# Patient Record
Sex: FEMALE | Race: BLACK OR AFRICAN AMERICAN | NOT HISPANIC OR LATINO | Employment: FULL TIME | RURAL
[De-identification: names, ages, dates, MRNs, and addresses within clinical notes are randomized per-mention and may not be internally consistent; named-entity substitution may affect disease eponyms.]

---

## 2017-01-06 RX ORDER — LINAGLIPTIN 5 MG/1
TABLET, FILM COATED ORAL
Qty: 30 TABLET | Refills: 5 | Status: SHIPPED | OUTPATIENT
Start: 2017-01-06 | End: 2017-06-26 | Stop reason: SDUPTHER

## 2017-01-06 RX ORDER — LEVOTHYROXINE SODIUM 0.07 MG/1
TABLET ORAL
Qty: 30 TABLET | Refills: 5 | Status: SHIPPED | OUTPATIENT
Start: 2017-01-06 | End: 2017-06-26 | Stop reason: SDUPTHER

## 2017-02-02 RX ORDER — NORGESTIMATE AND ETHINYL ESTRADIOL 0.25-0.035
1 KIT ORAL DAILY
Qty: 3 PACKAGE | Refills: 3 | Status: SHIPPED | OUTPATIENT
Start: 2017-02-02 | End: 2017-04-06 | Stop reason: SDUPTHER

## 2017-02-09 ENCOUNTER — OFFICE VISIT (OUTPATIENT)
Dept: FAMILY MEDICINE CLINIC | Facility: CLINIC | Age: 32
End: 2017-02-09

## 2017-02-09 VITALS
DIASTOLIC BLOOD PRESSURE: 96 MMHG | SYSTOLIC BLOOD PRESSURE: 144 MMHG | OXYGEN SATURATION: 99 % | RESPIRATION RATE: 20 BRPM | HEART RATE: 116 BPM | TEMPERATURE: 97.4 F | BODY MASS INDEX: 44.41 KG/M2 | WEIGHT: 293 LBS | HEIGHT: 68 IN

## 2017-02-09 DIAGNOSIS — E53.8 VITAMIN B 12 DEFICIENCY: Primary | ICD-10-CM

## 2017-02-09 DIAGNOSIS — E11.8 TYPE 2 DIABETES MELLITUS WITH COMPLICATION, WITHOUT LONG-TERM CURRENT USE OF INSULIN (HCC): ICD-10-CM

## 2017-02-09 DIAGNOSIS — J32.9 SINUSITIS, UNSPECIFIED CHRONICITY, UNSPECIFIED LOCATION: ICD-10-CM

## 2017-02-09 DIAGNOSIS — R05.9 COUGH: ICD-10-CM

## 2017-02-09 PROCEDURE — 99213 OFFICE O/P EST LOW 20 MIN: CPT | Performed by: FAMILY MEDICINE

## 2017-02-09 PROCEDURE — 96372 THER/PROPH/DIAG INJ SC/IM: CPT | Performed by: FAMILY MEDICINE

## 2017-02-09 RX ORDER — METHYLPREDNISOLONE 4 MG/1
TABLET ORAL
Qty: 21 TABLET | Refills: 0 | Status: SHIPPED | OUTPATIENT
Start: 2017-02-09 | End: 2017-03-24

## 2017-02-09 RX ORDER — NAPROXEN 500 MG/1
500 TABLET ORAL 2 TIMES DAILY PRN
COMMUNITY
End: 2018-04-04 | Stop reason: ALTCHOICE

## 2017-02-09 RX ORDER — CYANOCOBALAMIN 1000 UG/ML
1000 INJECTION, SOLUTION INTRAMUSCULAR; SUBCUTANEOUS
Status: DISCONTINUED | OUTPATIENT
Start: 2017-02-09 | End: 2019-04-08

## 2017-02-09 RX ORDER — AZITHROMYCIN 250 MG/1
TABLET, FILM COATED ORAL
Qty: 6 TABLET | Refills: 0 | Status: SHIPPED | OUTPATIENT
Start: 2017-02-09 | End: 2017-03-24

## 2017-02-09 RX ADMIN — CYANOCOBALAMIN 1000 MCG: 1000 INJECTION, SOLUTION INTRAMUSCULAR; SUBCUTANEOUS at 14:23

## 2017-02-09 NOTE — PROGRESS NOTES
"Subjective   Reginald Hassan is a 31 y.o. female presenting with chief complaint of:   Chief Complaint   Patient presents with   • URI     [facility] ER follow up, never got better, \"they didnt give me any antibotics?\"   • Sinusitis     [facility] ER follow up, never got better \"they didnt give me any antibotics?\"       History of Present Illness :  Alone.   Has an acute issue today: URI sxs.  Onset first of year sinus/nasal congestion-fullness with nares/posterior pharyngeal drainage. Associated with fever,sore throat, cough, wheeze. No SOB, hemoptysis, chest pain, nausea, nausea/vomiting, diarrhea. Has had to miss on/off work.      Other chronic problem/s to consider:  DM2: This has been present for years/over a year.  It is chronic.  It is controlled.  Home accuchecks rarely run.  No hypoglycemia manifest as syncope/near syncope or diaphoretic/sweating episodes.  A1c below.   Hypothyroidism: This has been present for years/over a year.  It is chronic.  It is stable as there has been stable labs and energy level, hair-skin texture, stooling, are all the same; no palpitations.  Obesity: This has been present for years/over a year.  It is chronic.     It is stable; there has been no recent major change in weight, or dieting    The following portions of the patient's history were reviewed and updated as appropriate: allergies, current medications, past family history, past medical history, past social history, past surgical history and problem list.  TCC migrated if needed/reviewed.      Current Outpatient Prescriptions:   •  Guaifenesin-Codeine (CHERATUSSIN AC PO), Take 2 teaspoon(s) by mouth Every 6 (Six) Hours As Needed (cough)., Disp: , Rfl:   •  levothyroxine (SYNTHROID, LEVOTHROID) 75 MCG tablet, TAKE ONE TABLET DAILY GENERIC FOR SYNTHROID, Disp: 30 tablet, Rfl: 5  •  metFORMIN (GLUCOPHAGE) 1000 MG tablet, Take 1 tablet by mouth Every Night., Disp: 30 tablet, Rfl: 5  •  naproxen (NAPROSYN) 500 MG tablet, Take 500 mg by mouth 2 " (Two) Times a Day With Meals., Disp: , Rfl:   •  norgestimate-ethinyl estradiol (SPRINTEC 28) 0.25-35 MG-MCG per tablet, Take 1 tablet by mouth Daily., Disp: 3 package, Rfl: 3  •  TRADJENTA 5 MG tablet tablet, TAKE ONE TABLET DAILY, Disp: 30 tablet, Rfl: 5    Current Facility-Administered Medications:   •  cyanocobalamin injection 1,000 mcg, 1,000 mcg, Intramuscular, Q30 Days, Theo Auguste MD    No Known Allergies    Review of Systems  GENERAL:  Active/home and work. Sleep is ok. No fever.  ENDO:  No syncope, near or diaphoretic sweaty spells.  BS, no download noted.  HEENT: No head injury but frontal/maxillary sore-headache,  No vision change, No hearing loss.  Ears without pain/drainage.  No sore throat.  No usual nasal/sinus congestion/drainage. No epistaxis.  CHEST: No chest wall tenderness or mass. No usual cough, wheeze, SOB; no hemoptysis.  CV: No chest pain, palpatations, ankle edema.  GI: No heartburn, dysphagia.  No abdominal pain, diarrhea, constipation, rectal bleeding, or melena.  :  Voids without dysuria, or incontience to completion.  ORTHO: No painful/swollen joints but various on /off sore.  No sore neck or back.  No acute neck or back pain without recent injury.   NEURO: No dizziness, weakness of extremities.  No numbness/parethesias.   PSYCH: No memory loss.  Mood good; not anxious, depressed but/and not suicidal.  Tolerated stress.     Results for orders placed or performed in visit on 11/14/16   Liquid-based Pap Smear, Screening   Result Value Ref Range    Case Report       Gynecologic Cytology Report                       Case: [id number]                                  Authorizing Provider:  Doris Woodson MD        Collected:           11/14/2016 01:33 PM          Ordering Location:     [facility]     Received:            11/14/2016 03:13 PM                                 [facility]                                                                 First Screen:        "   Kiesha Wilson                                                               Pathologist:           Antwan Pisano MD                                                    Specimen:    Liquid-Based Pap, Screening, Cervix                                                        HPV Report, Addendum       See scanned report      Interpretation (A) Negative for intraepithelial lesion or malignancy, Other, Other-Negative for squamous intraepithelial lesion, Negative for malignant cells, See final diagnosis, None- See Attached Report, Not diagnostic, See attached report, No diagnosis given, No int...     Atypical squamous cells of undetermined significance    General Categorization Epithelial cell abnormality, see interpretation     Other Findings Trichomonas vaginalis     Specimen Adequacy Satisfactory for evaluation     HPV Reflex? If ASCUS     Additional Information       Disclaimer: Cervical cytology is a screening test primarily for squamous cancers and precursors and has associated false negative and positive results. New technologies such as liquid based sampling may decrease but will not eliminate all false negative results. Regular (generally annual) sampling and follow-up of unexplained clinical symptoms are recommended to minimize false negative results (see [facility] report, 1999).      Embedded Images         Lab Results   Component Value Date    HGBA1C 6.6 (H) 08/22/2014       Lab Results   Component Value Date     08/22/2014    K 3.7 08/22/2014     08/22/2014    CO2 23 (L) 08/22/2014    GLUCOSE 132 (H) 08/22/2014    BUN 3 (L) 08/22/2014    CREATININE 0.69 08/22/2014    CALCIUM 9.0 08/22/2014       No results found for: PSA     Objective   Visit Vitals   • /96 (BP Location: Left arm, Patient Position: Sitting, Cuff Size: Large Adult)   • Pulse 116   • Temp 97.4 °F (36.3 °C) (Tympanic)   • Resp 20   • Ht 68\" (172.7 cm)   • Wt (!) 310 lb (141 kg)   • SpO2 99%   • BMI 47.14 kg/m2 "       Physical Exam  GENERAL:  Well nourished/developed in no acute distress. Obese  SKIN: Turgor excellent, without wound, rash, lesion.  HEENT: Normal cephalic without trauma.  Pupils equal round reactive to light. Extraocular motions full without nystagmus.   External canals nonobstructive nontender without reddness. Tymphatic membranes tomeka with bryson structures intact.   Oral cavity without growths, exudates, and moist.  Posterior pharnyx without mass, obstruction, reddness.  No thyroidmegaly, mass, tenderness, lymphadenopathy and supple. Nasal mucosa red/swollen with bilateral mucoid drainage.   CV: Regular rhythm.  No murmur, gallop,  edema. Posterior pulses intact.  No carotid bruits.   CHEST: No chest wall tenderness or mass.   LUNGS: Symmetric motion with clear to auscultation.  No dullness to percussion  ABD: Soft, nontender without mass.   ORTHO: Symmetric extremities without swelling/point tenderness.  Full gross range of motion.    NEURO: CN 2-12 grossly intact.  Symmetric facies.  UE/LE   4/5 strength throughout.  Nonfocal use extremities. Speech clear.     PSYCH: Oriented x 3.  Pleasant calm, well kept.  Purposeful/directed conservation with intact short/long gross memory.     Assessment/Plan     1. Vitamin B 12 deficiency  - cyanocobalamin injection 1,000 mcg; Inject 1 mL into the shoulder, thigh, or buttocks Every 30 (Thirty) Days.    2. Type 2 diabetes mellitus with complication, without long-term current use of insulin  - metFORMIN (GLUCOPHAGE) 1000 MG tablet; Take 1 tablet by mouth Every Night.  Dispense: 30 tablet; Refill: 5    3. Sinusitis, unspecified chronicity, unspecified location  Subacute/chronic?  - azithromycin (ZITHROMAX Z-ABBY) 250 MG tablet; Take 2 tablets the first day, then 1 tablet daily for 4 days.  Dispense: 6 tablet; Refill: 0  - MethylPREDNISolone (MEDROL, ABBY,) 4 MG tablet; Take as directed on package instructions.  Dispense: 21 tablet; Refill: 0    4. Cough  Sinobronchial  and maybe acute bronchitis  - azithromycin (ZITHROMAX Z-ABBY) 250 MG tablet; Take 2 tablets the first day, then 1 tablet daily for 4 days.  Dispense: 6 tablet; Refill: 0  - MethylPREDNISolone (MEDROL, ABBY,) 4 MG tablet; Take as directed on package instructions.  Dispense: 21 tablet; Refill: 0      Rx: reviewed.  Changes if above  LAB: reviewed/above, and if orders same 6/12  Wrap-up/other instructions     Patient Instructions   Try Z pack first; if in a couple days no better get the steroid BUT need to be watching sugar while taking       Follow up: Return for 6.26.17 as planned.

## 2017-02-09 NOTE — PROGRESS NOTES
Pt here for B 12 injection as ordered. Given in RVG . Pt tolerated well and no adverse reactions noted and pt left office.

## 2017-02-09 NOTE — PATIENT INSTRUCTIONS
Try Z pack first; if in a couple days no better get the steroid BUT need to be watching sugar while taking

## 2017-03-10 ENCOUNTER — CLINICAL SUPPORT (OUTPATIENT)
Dept: FAMILY MEDICINE CLINIC | Facility: CLINIC | Age: 32
End: 2017-03-10

## 2017-03-10 DIAGNOSIS — D51.9 ANEMIA DUE TO VITAMIN B12 DEFICIENCY, UNSPECIFIED B12 DEFICIENCY TYPE: ICD-10-CM

## 2017-03-10 PROCEDURE — 96372 THER/PROPH/DIAG INJ SC/IM: CPT | Performed by: FAMILY MEDICINE

## 2017-03-10 RX ADMIN — CYANOCOBALAMIN 1000 MCG: 1000 INJECTION, SOLUTION INTRAMUSCULAR; SUBCUTANEOUS at 16:14

## 2017-03-10 NOTE — PROGRESS NOTES
Pt here for B 12 injection as ordered. Given in LVG . Pt tolerated well and no adverse reactions noted and pt left office.

## 2017-03-24 ENCOUNTER — OFFICE VISIT (OUTPATIENT)
Dept: FAMILY MEDICINE CLINIC | Facility: CLINIC | Age: 32
End: 2017-03-24

## 2017-03-24 VITALS
SYSTOLIC BLOOD PRESSURE: 152 MMHG | HEART RATE: 130 BPM | HEIGHT: 68 IN | TEMPERATURE: 98.7 F | BODY MASS INDEX: 44.41 KG/M2 | DIASTOLIC BLOOD PRESSURE: 94 MMHG | WEIGHT: 293 LBS | RESPIRATION RATE: 20 BRPM | OXYGEN SATURATION: 99 %

## 2017-03-24 DIAGNOSIS — L40.9 PSORIASIS: Primary | ICD-10-CM

## 2017-03-24 PROCEDURE — 99212 OFFICE O/P EST SF 10 MIN: CPT | Performed by: FAMILY MEDICINE

## 2017-03-24 NOTE — PROGRESS NOTES
Subjective   Reginald Hassan is a 31 y.o. female presenting with chief complaint of:   Chief Complaint   Patient presents with   • Skin Problem       History of Present Illness :  Alone.  Has an acute issue today.  Has had this rash for years. Saw derm on referral 2005; dx acanthosis nigricans and lichen simplex chronicus/pickers nodules.     Other chronic problem/s to consider: none    The following portions of the patient's history were reviewed and updated as appropriate: allergies, current medications, past family history, past medical history, past social history, past surgical history and problem list.  TCC migrated if needed/reviewed.      Current Outpatient Prescriptions:   •  levothyroxine (SYNTHROID, LEVOTHROID) 75 MCG tablet, TAKE ONE TABLET DAILY GENERIC FOR SYNTHROID, Disp: 30 tablet, Rfl: 5  •  metFORMIN (GLUCOPHAGE) 1000 MG tablet, Take 1 tablet by mouth Every Night., Disp: 30 tablet, Rfl: 5  •  naproxen (NAPROSYN) 500 MG tablet, Take 500 mg by mouth 2 (Two) Times a Day With Meals., Disp: , Rfl:   •  norgestimate-ethinyl estradiol (SPRINTEC 28) 0.25-35 MG-MCG per tablet, Take 1 tablet by mouth Daily., Disp: 3 package, Rfl: 3  •  TRADJENTA 5 MG tablet tablet, TAKE ONE TABLET DAILY, Disp: 30 tablet, Rfl: 5    Current Facility-Administered Medications:   •  cyanocobalamin injection 1,000 mcg, 1,000 mcg, Intramuscular, Q30 Days, Theo Auguste MD, 1,000 mcg at 03/10/17 1614    No Known Allergies    Review of Systems  GENERAL:  Active/slower with limits, speed size. . Sleep is ok. No fever now.  ENDO:  No syncope, near or diaphoretic sweaty spells.  HEENT: No head injury or headache,  No vision change, No hearing loss.  Ears without pain/drainage.  No sore throat.  No nasal/sinus congestion/drainage. No epistaxis.  CHEST: No chest wall tenderness or mass. No cough,  without wheeze, SOB; no hemoptysis.  CV: No chest pain, palpatations, ankle edema.  GI: No heartburn, dysphagia.  No abdominal pain,  diarrhea, constipation, rectal bleeding, or melena.    :  Voids without dysuria, or incontience to completion.  ORTHO: No painful/swollen joints but various on /off sore.  No sore neck or back.  No acute neck or back pain without recent injury.   NEURO: No dizziness, weakness of extremities.  No numbness/parethesias.   PSYCH: No memory loss.  Mood good; not anxious, depressed but/and not suicidal.  Tolerated stress.     Results for orders placed or performed in visit on 11/14/16   Liquid-based Pap Smear, Screening   Result Value Ref Range    Case Report       Gynecologic Cytology Report                       Case: [id number]                                  Authorizing Provider:  Doris Woodson MD        Collected:           11/14/2016 01:33 PM          Ordering Location:     [facility]     Received:            11/14/2016 03:13 PM                                 [facility]                                                                 First Screen:          Kiesha Wilson                                                               Pathologist:           Antwan Pisano MD                                                    Specimen:    Liquid-Based Pap, Screening, Cervix                                                        HPV Report, Addendum       See scanned report      Interpretation (A) Negative for intraepithelial lesion or malignancy, Other, Other-Negative for squamous intraepithelial lesion, Negative for malignant cells, See final diagnosis, None- See Attached Report, Not diagnostic, See attached report, No diagnosis given, No int...     Atypical squamous cells of undetermined significance    General Categorization Epithelial cell abnormality, see interpretation     Other Findings Trichomonas vaginalis     Specimen Adequacy Satisfactory for evaluation     HPV Reflex? If ASCUS     Additional Information       Disclaimer: Cervical cytology is a screening test primarily for squamous  "cancers and precursors and has associated false negative and positive results. New technologies such as liquid based sampling may decrease but will not eliminate all false negative results. Regular (generally annual) sampling and follow-up of unexplained clinical symptoms are recommended to minimize false negative results (see [facility] report, 1999).      Embedded Images         Lab Results   Component Value Date    HGBA1C 6.6 (H) 08/22/2014       Lab Results   Component Value Date     08/22/2014    K 3.7 08/22/2014     08/22/2014    CO2 23 (L) 08/22/2014    GLUCOSE 132 (H) 08/22/2014    BUN 3 (L) 08/22/2014    CREATININE 0.69 08/22/2014    CALCIUM 9.0 08/22/2014       No results found for: PSA     Objective   /94 (BP Location: Left arm, Patient Position: Sitting, Cuff Size: Adult)  Pulse (!) 130  Temp 98.7 °F (37.1 °C) (Oral)   Resp 20  Ht 68\" (172.7 cm)  Wt (!) 312 lb (142 kg)  LMP 03/15/2017  SpO2 99%  Breastfeeding? No  BMI 47.44 kg/m2    Physical Exam  GENERAL:  Well nourished/developed in no acute distress. Obese   SKIN: Turgor excellent, without wound, rash, lesion. PHOTO L abdomen; similar all over  HEENT: Normal cephalic without trauma.  Pupils equal round reactive to light. Extraocular motions full without nystagmus.   External canals nonobstructive nontender without reddness. Tymphatic membranes tomeka with bryson structures intact.   Oral cavity without growths, exudates, and moist.  Posterior pharnyx without mass, obstruction, reddness.  No thyroidmegaly, mass, tenderness, lymphadenopathy and supple.  CV: Regular rhythm.  No murmur, gallop, trace LE edema. Posterior pulses intact.  No carotid bruits.  CHEST: No chest wall tenderness or mass.   LUNGS: Symmetric motion with clear to auscultation.   ABD: Soft, nontender without mass.   ORTHO: Symmetric extremities without swelling/point tenderness.  Full gross range of motion.    NEURO: CN 2-12 grossly intact.  Symmetric facies.  " UE/LE   3/5 strength throughout.  Nonfocal use extremities. Speech clear.     PSYCH: Oriented x 3.  Pleasant calm, well kept.  Purposeful/directed conservation with intact short/long gross memory.     Assessment/Plan     1. Psoriasis or other  - fluocinonide-emollient (LIDEX-E) 0.05 % cream; Apply  topically 2 (Two) Times a Day.  Dispense: 30 g; Refill: 0      Labs:   Rx above; try and see what results we get  Last time treated SalEx lotion acanthosis nigricans and locoid lipocream to 's nodules     There are no Patient Instructions on file for this visit.    Follow up: Return for 6/12m.

## 2017-04-06 ENCOUNTER — OFFICE VISIT (OUTPATIENT)
Dept: OBSTETRICS AND GYNECOLOGY | Facility: CLINIC | Age: 32
End: 2017-04-06

## 2017-04-06 VITALS — BODY MASS INDEX: 44.41 KG/M2 | HEIGHT: 68 IN | WEIGHT: 293 LBS

## 2017-04-06 DIAGNOSIS — N76.0 BV (BACTERIAL VAGINOSIS): Primary | ICD-10-CM

## 2017-04-06 DIAGNOSIS — B96.89 BV (BACTERIAL VAGINOSIS): Primary | ICD-10-CM

## 2017-04-06 PROCEDURE — 99213 OFFICE O/P EST LOW 20 MIN: CPT | Performed by: OBSTETRICS & GYNECOLOGY

## 2017-04-06 RX ORDER — FLUCONAZOLE 150 MG/1
150 TABLET ORAL DAILY
Qty: 2 TABLET | Refills: 0 | Status: SHIPPED | OUTPATIENT
Start: 2017-04-06 | End: 2017-06-26

## 2017-04-06 RX ORDER — CLOTRIMAZOLE 1 %
CREAM WITH APPLICATOR VAGINAL 2 TIMES DAILY
Qty: 1 EACH | Refills: 0 | Status: SHIPPED | OUTPATIENT
Start: 2017-04-06 | End: 2017-06-26

## 2017-04-06 RX ORDER — NORGESTIMATE AND ETHINYL ESTRADIOL 0.25-0.035
1 KIT ORAL DAILY
Qty: 3 PACKAGE | Refills: 3 | Status: SHIPPED | OUTPATIENT
Start: 2017-04-06 | End: 2018-04-04 | Stop reason: SDUPTHER

## 2017-04-06 NOTE — PROGRESS NOTES
Subjective   Reginald Hassan is a 31 y.o. female. Who presents with a possible vaginal infection.    History of Present Illness  Patient is a 32 yo WF who is concerned that she may have BV and she is having vaginal itching.  The following portions of the patient's history were reviewed and updated as appropriate: allergies, current medications, past family history, past medical history, past social history, past surgical history and problem list.    Review of Systems   Constitutional: Negative for fatigue and unexpected weight change.   HENT: Negative.    Eyes: Negative.    Respiratory: Negative for cough, chest tightness and shortness of breath.    Cardiovascular: Negative for chest pain, palpitations and leg swelling.   Gastrointestinal: Negative for abdominal distention, abdominal pain, anal bleeding, blood in stool, constipation, diarrhea, nausea and vomiting.   Endocrine: Negative for polydipsia and polyuria.   Genitourinary: Positive for vaginal discharge. Negative for difficulty urinating, dyspareunia, dysuria, frequency, genital sores, hematuria, menstrual problem, pelvic pain, urgency, vaginal bleeding and vaginal pain.        Vaginal itching   Musculoskeletal: Negative.    Skin: Negative for color change and rash.   Allergic/Immunologic: Negative.    Neurological: Negative.  Negative for dizziness, seizures, syncope, light-headedness and headaches.   Hematological: Negative for adenopathy. Does not bruise/bleed easily.   Psychiatric/Behavioral: Negative for agitation, confusion, dysphoric mood, sleep disturbance and suicidal ideas. The patient is not nervous/anxious.        Objective   Physical Exam   Constitutional: She is oriented to person, place, and time. She appears well-developed and well-nourished.   HENT:   Head: Normocephalic.   Eyes: Pupils are equal, round, and reactive to light.   Cardiovascular: Normal rate and regular rhythm.    Pulmonary/Chest: Effort normal and breath sounds normal.    Abdominal: Soft.   Genitourinary: Vagina normal and uterus normal. Rectal exam shows anal tone normal. Pelvic exam was performed with patient supine. No labial fusion. There is no rash, tenderness, lesion or injury on the right labia. There is no rash, tenderness, lesion or injury on the left labia. Uterus is not enlarged and not tender. Cervix exhibits no motion tenderness, no discharge and no friability. Right adnexum displays no mass, no tenderness and no fullness. Left adnexum displays no mass, no tenderness and no fullness. No erythema, tenderness or bleeding in the vagina. No signs of injury around the vagina. No vaginal discharge found.   Genitourinary Comments: BUS glands appear nl, perineum intact, urethral orifice appears nl, vagina has good support.   Musculoskeletal: Normal range of motion.   Neurological: She is alert and oriented to person, place, and time.   Skin: Skin is warm and dry.   Psychiatric: She has a normal mood and affect. Her behavior is normal.   Nursing note and vitals reviewed.      Assessment:    Yeast vaginitis  Rx Diflucan 150 x 2  Lotrimin cream

## 2017-04-17 ENCOUNTER — CLINICAL SUPPORT (OUTPATIENT)
Dept: FAMILY MEDICINE CLINIC | Facility: CLINIC | Age: 32
End: 2017-04-17

## 2017-04-17 DIAGNOSIS — E53.8 B12 DEFICIENCY: ICD-10-CM

## 2017-04-17 PROCEDURE — 96372 THER/PROPH/DIAG INJ SC/IM: CPT | Performed by: FAMILY MEDICINE

## 2017-04-17 RX ADMIN — CYANOCOBALAMIN 1000 MCG: 1000 INJECTION, SOLUTION INTRAMUSCULAR; SUBCUTANEOUS at 16:39

## 2017-06-06 ENCOUNTER — CLINICAL SUPPORT (OUTPATIENT)
Dept: FAMILY MEDICINE CLINIC | Facility: CLINIC | Age: 32
End: 2017-06-06

## 2017-06-06 DIAGNOSIS — E53.8 B12 DEFICIENCY: ICD-10-CM

## 2017-06-06 PROCEDURE — 96372 THER/PROPH/DIAG INJ SC/IM: CPT | Performed by: FAMILY MEDICINE

## 2017-06-06 RX ADMIN — CYANOCOBALAMIN 1000 MCG: 1000 INJECTION, SOLUTION INTRAMUSCULAR; SUBCUTANEOUS at 15:54

## 2017-06-13 ENCOUNTER — LAB (OUTPATIENT)
Dept: FAMILY MEDICINE CLINIC | Facility: CLINIC | Age: 32
End: 2017-06-13

## 2017-06-13 DIAGNOSIS — E66.9 OBESITY (BMI 35.0-39.9 WITHOUT COMORBIDITY): Primary | ICD-10-CM

## 2017-06-13 DIAGNOSIS — L03.311 CELLULITIS OF ABDOMINAL WALL: ICD-10-CM

## 2017-06-13 DIAGNOSIS — E03.9 HYPOTHYROIDISM, UNSPECIFIED TYPE: Chronic | ICD-10-CM

## 2017-06-13 DIAGNOSIS — E53.8 VITAMIN B12 DEFICIENCY: ICD-10-CM

## 2017-06-13 DIAGNOSIS — IMO0001 UNCONTROLLED TYPE 2 DIABETES MELLITUS WITHOUT COMPLICATION, WITHOUT LONG-TERM CURRENT USE OF INSULIN: ICD-10-CM

## 2017-06-13 DIAGNOSIS — Z00.00 WELLNESS EXAMINATION: ICD-10-CM

## 2017-06-14 DIAGNOSIS — IMO0001 UNCONTROLLED TYPE 2 DIABETES MELLITUS WITHOUT COMPLICATION, WITHOUT LONG-TERM CURRENT USE OF INSULIN: Primary | ICD-10-CM

## 2017-06-14 LAB
ALBUMIN SERPL-MCNC: 4.2 G/DL (ref 3.5–5.5)
ALBUMIN/GLOB SERPL: 1.4 {RATIO} (ref 1.2–2.2)
ALP SERPL-CCNC: 54 IU/L (ref 39–117)
ALT SERPL-CCNC: 34 IU/L (ref 0–32)
AST SERPL-CCNC: 18 IU/L (ref 0–40)
BASOPHILS # BLD AUTO: 0 X10E3/UL (ref 0–0.2)
BASOPHILS NFR BLD AUTO: 1 %
BILIRUB SERPL-MCNC: 0.4 MG/DL (ref 0–1.2)
BUN SERPL-MCNC: 6 MG/DL (ref 6–20)
BUN/CREAT SERPL: 10 (ref 9–23)
CALCIUM SERPL-MCNC: 9 MG/DL (ref 8.7–10.2)
CHLORIDE SERPL-SCNC: 98 MMOL/L (ref 96–106)
CHOLEST SERPL-MCNC: 173 MG/DL (ref 100–199)
CO2 SERPL-SCNC: 22 MMOL/L (ref 18–29)
CREAT SERPL-MCNC: 0.62 MG/DL (ref 0.57–1)
EOSINOPHIL # BLD AUTO: 0.3 X10E3/UL (ref 0–0.4)
EOSINOPHIL NFR BLD AUTO: 4 %
ERYTHROCYTE [DISTWIDTH] IN BLOOD BY AUTOMATED COUNT: 14 % (ref 12.3–15.4)
FERRITIN SERPL-MCNC: 46 NG/ML (ref 15–150)
FOLATE SERPL-MCNC: 10.2 NG/ML
GLOBULIN SER CALC-MCNC: 3.1 G/DL (ref 1.5–4.5)
GLUCOSE SERPL-MCNC: 193 MG/DL (ref 65–99)
HBA1C MFR BLD: 9.1 % (ref 4.8–5.6)
HCT VFR BLD AUTO: 37 % (ref 34–46.6)
HDLC SERPL-MCNC: 49 MG/DL
HGB BLD-MCNC: 12.2 G/DL (ref 11.1–15.9)
IMM GRANULOCYTES # BLD: 0 X10E3/UL (ref 0–0.1)
IMM GRANULOCYTES NFR BLD: 0 %
LDLC SERPL CALC-MCNC: 99 MG/DL (ref 0–99)
LYMPHOCYTES # BLD AUTO: 2.7 X10E3/UL (ref 0.7–3.1)
LYMPHOCYTES NFR BLD AUTO: 41 %
MCH RBC QN AUTO: 26.8 PG (ref 26.6–33)
MCHC RBC AUTO-ENTMCNC: 33 G/DL (ref 31.5–35.7)
MCV RBC AUTO: 81 FL (ref 79–97)
MICROALBUMIN UR-MCNC: 91.7 UG/ML
MONOCYTES # BLD AUTO: 0.4 X10E3/UL (ref 0.1–0.9)
MONOCYTES NFR BLD AUTO: 7 %
NEUTROPHILS # BLD AUTO: 3.2 X10E3/UL (ref 1.4–7)
NEUTROPHILS NFR BLD AUTO: 47 %
PLATELET # BLD AUTO: 319 X10E3/UL (ref 150–379)
POTASSIUM SERPL-SCNC: 4.2 MMOL/L (ref 3.5–5.2)
PROT SERPL-MCNC: 7.3 G/DL (ref 6–8.5)
RBC # BLD AUTO: 4.56 X10E6/UL (ref 3.77–5.28)
SODIUM SERPL-SCNC: 137 MMOL/L (ref 134–144)
T4 SERPL-MCNC: 14.1 UG/DL (ref 4.5–12)
TRIGL SERPL-MCNC: 123 MG/DL (ref 0–149)
TSH SERPL DL<=0.005 MIU/L-ACNC: 4.78 UIU/ML (ref 0.45–4.5)
VIT B12 SERPL-MCNC: 607 PG/ML (ref 211–946)
VLDLC SERPL CALC-MCNC: 25 MG/DL (ref 5–40)
WBC # BLD AUTO: 6.6 X10E3/UL (ref 3.4–10.8)

## 2017-06-16 DIAGNOSIS — E66.9 OBESITY (BMI 35.0-39.9 WITHOUT COMORBIDITY): ICD-10-CM

## 2017-06-16 DIAGNOSIS — IMO0001 UNCONTROLLED TYPE 2 DIABETES MELLITUS WITHOUT COMPLICATION, WITHOUT LONG-TERM CURRENT USE OF INSULIN: Primary | ICD-10-CM

## 2017-06-26 ENCOUNTER — OFFICE VISIT (OUTPATIENT)
Dept: FAMILY MEDICINE CLINIC | Facility: CLINIC | Age: 32
End: 2017-06-26

## 2017-06-26 VITALS
TEMPERATURE: 98.4 F | HEART RATE: 114 BPM | BODY MASS INDEX: 44.41 KG/M2 | WEIGHT: 293 LBS | SYSTOLIC BLOOD PRESSURE: 144 MMHG | HEIGHT: 68 IN | DIASTOLIC BLOOD PRESSURE: 96 MMHG | OXYGEN SATURATION: 98 % | RESPIRATION RATE: 20 BRPM

## 2017-06-26 DIAGNOSIS — IMO0001 UNCONTROLLED TYPE 2 DIABETES MELLITUS WITHOUT COMPLICATION, WITHOUT LONG-TERM CURRENT USE OF INSULIN: ICD-10-CM

## 2017-06-26 DIAGNOSIS — E03.9 HYPOTHYROIDISM, UNSPECIFIED TYPE: Primary | Chronic | ICD-10-CM

## 2017-06-26 DIAGNOSIS — E66.9 OBESITY (BMI 35.0-39.9 WITHOUT COMORBIDITY): ICD-10-CM

## 2017-06-26 DIAGNOSIS — E11.8 TYPE 2 DIABETES MELLITUS WITH COMPLICATION, WITHOUT LONG-TERM CURRENT USE OF INSULIN (HCC): ICD-10-CM

## 2017-06-26 PROCEDURE — 99214 OFFICE O/P EST MOD 30 MIN: CPT | Performed by: FAMILY MEDICINE

## 2017-06-26 RX ORDER — GLYBURIDE 5 MG/1
5 TABLET ORAL 2 TIMES DAILY WITH MEALS
Qty: 60 TABLET | Refills: 5 | Status: SHIPPED | OUTPATIENT
Start: 2017-06-26 | End: 2018-06-05 | Stop reason: SDUPTHER

## 2017-06-26 RX ORDER — LEVOTHYROXINE SODIUM 0.07 MG/1
75 TABLET ORAL DAILY
Qty: 30 TABLET | Refills: 5 | Status: SHIPPED | OUTPATIENT
Start: 2017-06-26 | End: 2017-08-31 | Stop reason: CLARIF

## 2017-06-26 NOTE — PROGRESS NOTES
Subjective   Reginald Hassan is a 31 y.o. female presenting with chief complaint of:   Chief Complaint   Patient presents with   • Diabetes   • Obesity   • Hypothyroidism   • Hypertension     BP was 144/96 pt states she just finished mowing yard before coming in       History of Present Illness :  Alone.   Has multiple chronic problems; interval appointment.  One of these problems is hypothyroidism.  Hypothyroidism: This has been present for years/over a year.  It is chronic.  It is stable as there has been stable labs and energy level, hair-skin texture, stooling, are all the same; no palpitations.. Thyroid replaced     Other chronic problem/s to consider:   DM2: This has been present for years/over a year.  It is chronic.  It is uncontrolled.  Home accuchecks rarely run.  No hypoglycemia manifest as syncope/near syncope or diaphoretic/sweating episodes.  A1c below if available..   Obesity: This has been present for years/over a year.  It is chronic.     It is stable; there has been no recent major change in weight, or dieting; have discussed obesity surgery with her.     The following portions of the patient's history were reviewed and updated as appropriate: allergies, current medications, past family history, past medical history, past social history, past surgical history and problem list.  TCC migrated if needed/reviewed.      Current Outpatient Prescriptions:   •  levothyroxine (SYNTHROID, LEVOTHROID) 75 MCG tablet, Take 1 tablet by mouth Daily., Disp: 30 tablet, Rfl: 5  •  metFORMIN (GLUCOPHAGE) 1000 MG tablet, Take 1 tablet by mouth Every Night., Disp: 30 tablet, Rfl: 5  •  naproxen (NAPROSYN) 500 MG tablet, Take 500 mg by mouth 2 (Two) Times a Day As Needed., Disp: , Rfl:   •  norgestimate-ethinyl estradiol (SPRINTEC 28) 0.25-35 MG-MCG per tablet, Take 1 tablet by mouth Daily., Disp: 3 package, Rfl: 3  •  TRADJENTA 5 MG tablet tablet, TAKE ONE TABLET DAILY, Disp: 30 tablet, Rfl: 5    Current  Facility-Administered Medications:   •  cyanocobalamin injection 1,000 mcg, 1,000 mcg, Intramuscular, Q30 Days, Theo Auguste MD, 1,000 mcg at 06/06/17 1554    No Known Allergies    Review of Systems  GENERAL:  Active/slower with limits, speed, samni for age and work/home. Sleep is ok; apnea denied.  ENDO:  No syncope, near or diaphoretic sweaty spells.  No checking./no download noted.  HEENT: No head injury occ/same headache,  No vision change, No hearing loss.  Ears without pain/drainage.  No sore throat.  No significant nasal/sinus congestion/drainage. No epistaxis.  CHEST: No chest wall tenderness or mass. No cough, without wheeze, SOB; no hemoptysis.  CV: No chest pain, palpatations, usual end of day equal ankle edema.  GI: No heartburn, dysphagia.  No abdominal pain, diarrhea, constipation, rectal bleeding, or melena.    :  Voids without dysuria, or incontience to completion.  ORTHO: No painful/swollen joints but various on /off sore.  No change occ sore neck or back.  No acute neck or back pain without recent injury.   NEURO: No dizziness, weakness of extremities.  No numbness/parethesias.   PSYCH: No memory loss.  Mood good; not that anxious, depressed but/and not suicidal.  Tolerated stress.     Results for orders placed or performed in visit on 06/13/17   Comprehensive metabolic panel   Result Value Ref Range    Glucose 193 (H) 65 - 99 mg/dL    BUN 6 6 - 20 mg/dL    Creatinine 0.62 0.57 - 1.00 mg/dL    eGFR Non African Am 121 >59 mL/min/1.73    eGFR African Am 139 >59 mL/min/1.73    BUN/Creatinine Ratio 10 9 - 23    Sodium 137 134 - 144 mmol/L    Potassium 4.2 3.5 - 5.2 mmol/L    Chloride 98 96 - 106 mmol/L    Total CO2 22 18 - 29 mmol/L    Calcium 9.0 8.7 - 10.2 mg/dL    Total Protein 7.3 6.0 - 8.5 g/dL    Albumin 4.2 3.5 - 5.5 g/dL    Globulin 3.1 1.5 - 4.5 g/dL    A/G Ratio 1.4 1.2 - 2.2    Total Bilirubin 0.4 0.0 - 1.2 mg/dL    Alkaline Phosphatase 54 39 - 117 IU/L    AST (SGOT) 18 0 - 40 IU/L     ALT (SGPT) 34 (H) 0 - 32 IU/L   Lipid panel   Result Value Ref Range    Total Cholesterol 173 100 - 199 mg/dL    Triglycerides 123 0 - 149 mg/dL    HDL Cholesterol 49 >39 mg/dL    VLDL Cholesterol 25 5 - 40 mg/dL    LDL Cholesterol  99 0 - 99 mg/dL   T4   Result Value Ref Range    T4, Total 14.1 (H) 4.5 - 12.0 ug/dL   TSH   Result Value Ref Range    TSH 4.780 (H) 0.450 - 4.500 uIU/mL   Hemoglobin A1c   Result Value Ref Range    Hemoglobin A1C 9.1 (H) 4.8 - 5.6 %   Ferritin   Result Value Ref Range    Ferritin 46 15 - 150 ng/mL   Vitamin B12 and Folate   Result Value Ref Range    Vitamin B-12 607 211 - 946 pg/mL    Folate 10.2 >3.0 ng/mL   Albumin, urine, random   Result Value Ref Range    Microalbumin, Urine 91.7 Not Estab. ug/mL   CBC and Differential   Result Value Ref Range    WBC 6.6 3.4 - 10.8 x10E3/uL    RBC 4.56 3.77 - 5.28 x10E6/uL    Hemoglobin 12.2 11.1 - 15.9 g/dL    Hematocrit 37.0 34.0 - 46.6 %    MCV 81 79 - 97 fL    MCH 26.8 26.6 - 33.0 pg    MCHC 33.0 31.5 - 35.7 g/dL    RDW 14.0 12.3 - 15.4 %    Platelets 319 150 - 379 x10E3/uL    Neutrophil Rel % 47 %    Lymphocyte Rel % 41 %    Monocyte Rel % 7 %    Eosinophil Rel % 4 %    Basophil Rel % 1 %    Neutrophils Absolute 3.2 1.4 - 7.0 x10E3/uL    Lymphocytes Absolute 2.7 0.7 - 3.1 x10E3/uL    Monocytes Absolute 0.4 0.1 - 0.9 x10E3/uL    Eosinophils Absolute 0.3 0.0 - 0.4 x10E3/uL    Basophils Absolute 0.0 0.0 - 0.2 x10E3/uL    Immature Granulocyte Rel % 0 %    Immature Grans Absolute 0.0 0.0 - 0.1 x10E3/uL       Lab Results   Component Value Date    HGBA1C 9.1 (H) 06/13/2017    HGBA1C 6.6 (H) 08/22/2014       Lab Results   Component Value Date     06/13/2017     08/22/2014    K 4.2 06/13/2017    K 3.7 08/22/2014    CL 98 06/13/2017     08/22/2014    CO2 22 06/13/2017    CO2 23 (L) 08/22/2014    GLUCOSE 132 (H) 08/22/2014    BUN 6 06/13/2017    BUN 3 (L) 08/22/2014    CREATININE 0.62 06/13/2017    CREATININE 0.69 08/22/2014     "CALCIUM 9.0 06/13/2017    CALCIUM 9.0 08/22/2014       No results found for: PSA     Lab Results:  CBC:    Lab Results - Last 18 Months  Lab Units 06/13/17  1038   WBC x10E3/uL 6.6   HEMATOCRIT % 37.0     CMP:    Lab Results - Last 18 Months  Lab Units 06/13/17  1038   SODIUM mmol/L 137   CHLORIDE mmol/L 98   TOTAL CO2, ARTERIAL mmol/L 22   BUN mg/dL 6   CREATININE mg/dL 0.62   EGFR IF NONAFRICN AM mL/min/1.73 121   EGFR IF AFRICN AM mL/min/1.73 139   CALCIUM mg/dL 9.0     THYROID:    Lab Results - Last 18 Months  Lab Units 06/13/17  1038   TSH uIU/mL 4.780*     A1C:    Lab Results - Last 18 Months  Lab Units 06/13/17  1038   HEMOGLOBIN A1C % 9.1*       Objective   /96 (BP Location: Left arm, Patient Position: Sitting, Cuff Size: Adult)  Pulse 114  Temp 98.4 °F (36.9 °C) (Oral)   Resp 20  Ht 68\" (172.7 cm)  Wt (!) 312 lb (142 kg)  LMP 06/12/2017 (Exact Date)  SpO2 98%  Breastfeeding? No  BMI 47.44 kg/m2    Physical Exam  GENERAL:  Well nourished/developed in no acute distress. Obese/morbid.  SKIN: Turgor excellent, without wound, rash, lesion.  HEENT: Normal cephalic without trauma.  Pupils equal round reactive to light. Extraocular motions full without nystagmus.   External canals nonobstructive nontender without reddness. Tymphatic membranes tomeka with bryson structures intact.   Oral cavity without growths, exudates, and moist.  Posterior pharnyx without mass, obstruction, reddness.  No thyroidmegaly, mass, tenderness, lymphadenopathy and supple.  CV: Regular rhythm.  No murmur, gallop,  edema. Posterior pulses intact.  No carotid bruits.  CHEST: No chest wall tenderness or mass.   LUNGS: Symmetric motion with clear to auscultation.  No dullness to percussion  ABD: Soft, nontender without mass.   ORTHO: Symmetric extremities without swelling/point tenderness.  Full gross range of motion.  NEURO: CN 2-12 grossly intact.  Symmetric facies. 1/4 x bicep knee equal reflexes.  UE/LE   4/5 strength " throughout.  Nonfocal use extremities. Speech clear. Intact light touch with monofilament, vibratory sensation with tuning fork; equal toes/distal feet.    PSYCH: Oriented x 3.  Pleasant calm, well kept.  Purposeful/directed conservation with intact short/long gross memory.     Assessment/Plan     1. Hypothyroidism, unspecified type  replaced    2. Uncontrolled type 2 diabetes mellitus without complication, without long-term current use of insulin  - glyBURIDE (DIABETA) 5 MG tablet; Take 1 tablet by mouth 2 (Two) Times a Day With Meals.  Dispense: 60 tablet; Refill: 5  - linagliptin (TRADJENTA) 5 MG tablet tablet; Take 1 tablet by mouth Daily.  Dispense: 30 tablet; Refill: 5    3. Type 2 diabetes mellitus with complication, without long-term current use of insulin  - metFORMIN (GLUCOPHAGE) 1000 MG tablet; Take 1 tablet by mouth 2 (Two) Times a Day With Meals.  Dispense: 30 tablet; Refill: 5    4. Obesity (BMI 35.0-39.9 without comorbidity)      Rx: reviewed.  Any other changes above and:   LAB: reviewed/above.  Orders above and: 3 extra/6 and 12 as usual  Discussed obesity surgery.   Patient Instructions   Double your metformin 1000 once a day to twice a day    Start the new diabetes medication glyburide    Goal BS morning fasting around 100-120; rest of day < 160  Checking times ; one a day  ..1. Before breakfast  2. 2 hrs after breakfast  3. Before lunch  4. 2 hrs after lunch  5. Before dinner/supper  6. 2 hrs after dinner/supper  7. Bedtime  (usually one a day just randomly picking a different time each day in order to survey your sugar control through a day)    Weight loss needed      Follow up: Return in about 6 months (around 12/26/2017) for 6m ro and lab before or lab with -extra lab 2m.

## 2017-06-26 NOTE — PATIENT INSTRUCTIONS
Double your metformin 1000 once a day to twice a day    Start the new diabetes medication glyburide    Goal BS morning fasting around 100-120; rest of day < 160  Checking times ; one a day  ..1. Before breakfast  2. 2 hrs after breakfast  3. Before lunch  4. 2 hrs after lunch  5. Before dinner/supper  6. 2 hrs after dinner/supper  7. Bedtime  (usually one a day just randomly picking a different time each day in order to survey your sugar control through a day)    Weight loss needed

## 2017-07-24 DIAGNOSIS — E11.8 TYPE 2 DIABETES MELLITUS WITH COMPLICATION, WITHOUT LONG-TERM CURRENT USE OF INSULIN (HCC): ICD-10-CM

## 2017-07-25 ENCOUNTER — TELEPHONE (OUTPATIENT)
Dept: FAMILY MEDICINE CLINIC | Facility: CLINIC | Age: 32
End: 2017-07-25

## 2017-07-25 ENCOUNTER — OFFICE VISIT (OUTPATIENT)
Dept: FAMILY MEDICINE CLINIC | Facility: CLINIC | Age: 32
End: 2017-07-25

## 2017-07-25 VITALS
DIASTOLIC BLOOD PRESSURE: 78 MMHG | OXYGEN SATURATION: 99 % | WEIGHT: 293 LBS | HEART RATE: 118 BPM | RESPIRATION RATE: 20 BRPM | SYSTOLIC BLOOD PRESSURE: 128 MMHG | HEIGHT: 68 IN | BODY MASS INDEX: 44.41 KG/M2 | TEMPERATURE: 98.2 F

## 2017-07-25 DIAGNOSIS — L02.91 ABSCESS: ICD-10-CM

## 2017-07-25 DIAGNOSIS — E53.8 VITAMIN B12 DEFICIENCY: ICD-10-CM

## 2017-07-25 DIAGNOSIS — IMO0001 UNCONTROLLED TYPE 2 DIABETES MELLITUS WITHOUT COMPLICATION, WITHOUT LONG-TERM CURRENT USE OF INSULIN: Primary | ICD-10-CM

## 2017-07-25 DIAGNOSIS — L03.319 CELLULITIS OF TRUNK, UNSPECIFIED SITE OF TRUNK: ICD-10-CM

## 2017-07-25 PROCEDURE — 96372 THER/PROPH/DIAG INJ SC/IM: CPT | Performed by: FAMILY MEDICINE

## 2017-07-25 PROCEDURE — 10060 I&D ABSCESS SIMPLE/SINGLE: CPT | Performed by: FAMILY MEDICINE

## 2017-07-25 PROCEDURE — 99212 OFFICE O/P EST SF 10 MIN: CPT | Performed by: FAMILY MEDICINE

## 2017-07-25 RX ORDER — SULFAMETHOXAZOLE AND TRIMETHOPRIM 800; 160 MG/1; MG/1
1 TABLET ORAL 3 TIMES DAILY
Qty: 30 TABLET | Refills: 0 | Status: SHIPPED | OUTPATIENT
Start: 2017-07-25 | End: 2017-12-26

## 2017-07-25 RX ADMIN — CYANOCOBALAMIN 1000 MCG: 1000 INJECTION, SOLUTION INTRAMUSCULAR; SUBCUTANEOUS at 15:15

## 2017-07-25 NOTE — PATIENT INSTRUCTIONS
The patient was advised to keep the area dry as able...showering is ok if brief and dry well when done.  Remove packing 5 days. Expect phone call on culture.

## 2017-07-25 NOTE — TELEPHONE ENCOUNTER
Pt came in and filled out nurse communication form and states that she has a knot or some type of lump on her  breast states area is sore and red  It has been there for about 1 wk [phone]

## 2017-07-25 NOTE — PROGRESS NOTES
Subjective   Reginald Hassan is a 31 y.o. female presenting with chief complaint of:   Chief Complaint   Patient presents with   • Breast Problem     Right        History of Present Illness :  Alone.  Has an acute issue today: called today with c/o 4-5 days of increasing pain R breast nodule.  Has had cellulitis before.   Has multiple chronic problems.  One of these problems is DM/2 usually uncontrolled.     Other chronic problem/s to consider:   DM2: This has been present for years/over a year.  It is chronic.  It is not controlled.  Home accuchecks run high generally.  A1c below if available..   Obesity: This has been present for years/over a year.  It is chronic.     It is stable; there has been no recent major change in weight, or dieting; has been advised weight loss.     The following portions of the patient's history were reviewed and updated as appropriate: allergies, current medications, past family history, past medical history, past social history, past surgical history and problem list.      Current Outpatient Prescriptions:   •  glyBURIDE (DIABETA) 5 MG tablet, Take 1 tablet by mouth 2 (Two) Times a Day With Meals., Disp: 60 tablet, Rfl: 5  •  levothyroxine (SYNTHROID, LEVOTHROID) 75 MCG tablet, Take 1 tablet by mouth Daily., Disp: 30 tablet, Rfl: 5  •  linagliptin (TRADJENTA) 5 MG tablet tablet, Take 1 tablet by mouth Daily., Disp: 30 tablet, Rfl: 5  •  metFORMIN (GLUCOPHAGE) 1000 MG tablet, Take 1 tablet by mouth 2 (Two) Times a Day With Meals., Disp: 60 tablet, Rfl: 5  •  naproxen (NAPROSYN) 500 MG tablet, Take 500 mg by mouth 2 (Two) Times a Day As Needed., Disp: , Rfl:   •  norgestimate-ethinyl estradiol (SPRINTEC 28) 0.25-35 MG-MCG per tablet, Take 1 tablet by mouth Daily., Disp: 3 package, Rfl: 3    Current Facility-Administered Medications:   •  cyanocobalamin injection 1,000 mcg, 1,000 mcg, Intramuscular, Q30 Days, Theo Auguste MD, 1,000 mcg at 07/25/17 0565    No Known  Allergies    Review of Systems  GENERAL:  Active home/work despite this. Sleep is ok. No fever now.  ENDO:  No syncope, near or diaphoretic sweaty spells.  No download noted.      Results for orders placed or performed in visit on 06/13/17   Comprehensive metabolic panel   Result Value Ref Range    Glucose 193 (H) 65 - 99 mg/dL    BUN 6 6 - 20 mg/dL    Creatinine 0.62 0.57 - 1.00 mg/dL    eGFR Non African Am 121 >59 mL/min/1.73    eGFR African Am 139 >59 mL/min/1.73    BUN/Creatinine Ratio 10 9 - 23    Sodium 137 134 - 144 mmol/L    Potassium 4.2 3.5 - 5.2 mmol/L    Chloride 98 96 - 106 mmol/L    Total CO2 22 18 - 29 mmol/L    Calcium 9.0 8.7 - 10.2 mg/dL    Total Protein 7.3 6.0 - 8.5 g/dL    Albumin 4.2 3.5 - 5.5 g/dL    Globulin 3.1 1.5 - 4.5 g/dL    A/G Ratio 1.4 1.2 - 2.2    Total Bilirubin 0.4 0.0 - 1.2 mg/dL    Alkaline Phosphatase 54 39 - 117 IU/L    AST (SGOT) 18 0 - 40 IU/L    ALT (SGPT) 34 (H) 0 - 32 IU/L   Lipid panel   Result Value Ref Range    Total Cholesterol 173 100 - 199 mg/dL    Triglycerides 123 0 - 149 mg/dL    HDL Cholesterol 49 >39 mg/dL    VLDL Cholesterol 25 5 - 40 mg/dL    LDL Cholesterol  99 0 - 99 mg/dL   T4   Result Value Ref Range    T4, Total 14.1 (H) 4.5 - 12.0 ug/dL   TSH   Result Value Ref Range    TSH 4.780 (H) 0.450 - 4.500 uIU/mL   Hemoglobin A1c   Result Value Ref Range    Hemoglobin A1C 9.1 (H) 4.8 - 5.6 %   Ferritin   Result Value Ref Range    Ferritin 46 15 - 150 ng/mL   Vitamin B12 and Folate   Result Value Ref Range    Vitamin B-12 607 211 - 946 pg/mL    Folate 10.2 >3.0 ng/mL   Albumin, urine, random   Result Value Ref Range    Microalbumin, Urine 91.7 Not Estab. ug/mL   CBC and Differential   Result Value Ref Range    WBC 6.6 3.4 - 10.8 x10E3/uL    RBC 4.56 3.77 - 5.28 x10E6/uL    Hemoglobin 12.2 11.1 - 15.9 g/dL    Hematocrit 37.0 34.0 - 46.6 %    MCV 81 79 - 97 fL    MCH 26.8 26.6 - 33.0 pg    MCHC 33.0 31.5 - 35.7 g/dL    RDW 14.0 12.3 - 15.4 %    Platelets 319 150 -  "379 x10E3/uL    Neutrophil Rel % 47 %    Lymphocyte Rel % 41 %    Monocyte Rel % 7 %    Eosinophil Rel % 4 %    Basophil Rel % 1 %    Neutrophils Absolute 3.2 1.4 - 7.0 x10E3/uL    Lymphocytes Absolute 2.7 0.7 - 3.1 x10E3/uL    Monocytes Absolute 0.4 0.1 - 0.9 x10E3/uL    Eosinophils Absolute 0.3 0.0 - 0.4 x10E3/uL    Basophils Absolute 0.0 0.0 - 0.2 x10E3/uL    Immature Granulocyte Rel % 0 %    Immature Grans Absolute 0.0 0.0 - 0.1 x10E3/uL       Lab Results   Component Value Date    HGBA1C 9.1 (H) 06/13/2017    HGBA1C 6.6 (H) 08/22/2014       Lab Results   Component Value Date     06/13/2017     08/22/2014    K 4.2 06/13/2017    K 3.7 08/22/2014    CL 98 06/13/2017     08/22/2014    CO2 22 06/13/2017    CO2 23 (L) 08/22/2014    GLUCOSE 132 (H) 08/22/2014    BUN 6 06/13/2017    BUN 3 (L) 08/22/2014    CREATININE 0.62 06/13/2017    CREATININE 0.69 08/22/2014    CALCIUM 9.0 06/13/2017    CALCIUM 9.0 08/22/2014       No results found for: PSA     Lab Results:  CBC:    Lab Results - Last 18 Months  Lab Units 06/13/17  1038   WBC x10E3/uL 6.6   HEMATOCRIT % 37.0     CMP:    Lab Results - Last 18 Months  Lab Units 06/13/17  1038   SODIUM mmol/L 137   CHLORIDE mmol/L 98   TOTAL CO2, ARTERIAL mmol/L 22   BUN mg/dL 6   CREATININE mg/dL 0.62   EGFR IF NONAFRICN AM mL/min/1.73 121   EGFR IF AFRICN AM mL/min/1.73 139   CALCIUM mg/dL 9.0     THYROID:    Lab Results - Last 18 Months  Lab Units 06/13/17  1038   TSH uIU/mL 4.780*     A1C:    Lab Results - Last 18 Months  Lab Units 06/13/17  1038   HEMOGLOBIN A1C % 9.1*       Objective   /78 (BP Location: Right arm, Patient Position: Sitting, Cuff Size: Adult)  Pulse 118  Temp 98.2 °F (36.8 °C) (Oral)   Resp 20  Ht 68\" (172.7 cm)  Wt (!) 316 lb (143 kg)  LMP 07/01/2017  SpO2 99%  Breastfeeding? No  BMI 48.05 kg/m2    Physical Exam  GENERAL:  Well nourished/developed in no acute distress. Obese   SKIN: Turgor excellent, without wound, rash, lesion " other than 3 cm fluctuant red/sore area R breast 7 oclock (when opened tablespoon purulent material)  HEENT: Normal cephalic without trauma.  Pupils equal round reactive to light. Extraocular motions full without nystagmus. No thyroidmegaly, mass, tenderness, lymphadenopathy and supple.  CV: Regular rhythm.  No murmur, gallop,  edema.  CHEST: No other chest wall tenderness or mass.   LUNGS: Symmetric motion with clear to auscultation.  ABD: Soft, nontender without mass.   ORTHO: Symmetric extremities without swelling/point tenderness.  Full gross range of motion.  NEURO: CN 2-12 grossly intact.  Symmetric facies.  UE/LE   3/5 strength throughout.  Nonfocal use extremities. Speech clear.     PSYCH: Oriented x 3.  Pleasant calm, well kept.  Purposeful/directed conservation with intact short/long gross memory.     Assessment/Plan     1. Vitamin B12 deficiency  replaced    2. Uncontrolled type 2 diabetes mellitus without complication, without long-term current use of insulin    3. Cellulitis of trunk, unspecified site of trunk  R breast   - sulfamethoxazole-trimethoprim (BACTRIM DS) 800-160 MG per tablet; Take 1 tablet by mouth 3 (Three) Times a Day.  Dispense: 30 tablet; Refill: 0    4. Abscess  R breast  - Anaerobic & Aerobic Culture ([facility] Only); Future  - Anaerobic & Aerobic Culture ([facility] Only)      Rx: reviewed.  Any other changes above and: pending culture  LAB: reviewed/above.  Orders above and: culture pending    Patient gave verbal permission to the procedure as described below.  Was advised there are risk of pain, scaring, infection, regrowth, need for revision/additional treatment/referral  and possible additional costs of a pathology report.  The surgical area was cleansed with betadine then wiped off with an alcohol wipe.  The lesion and surrounding tissue was injected with 2% xylocaine with epinephrine no more than 2cc.  Once the area was numb we used #11 blade to open the surface .  We expressed the  material from the area; 1 tablespoon.  We packed with wound with 1/4 iodoform gauze 3 inch internal and 1 in left hanging out.  We cleansed the area with peroxide.  Nursing cleansed the surgery site/repair and applied bandage to cover the wound.    The patient appeared to tolerate the procedure well.  The patient was asked to cleanse similiarly 2-3 times a day or prn.  The patient was asked to report signs of increased infection such as reddness, increasing pain, persistant drainage, or obvious problems now or regrowth in the future.  The patient was advised to keep the area dry as able...showering is ok if brief and dry well when done. The patient was advised there  culture: if there was to be sure they are called the results and if not called to call us 7-10 days.     Patient Instructions   The patient was advised to keep the area dry as able...showering is ok if brief and dry well when done.  Remove packing 5 days. Expect phone call on culture.       Follow up: Return for as planned 12.26.17.

## 2017-08-01 LAB
BACTERIA SPEC AEROBE CULT: ABNORMAL
BACTERIA SPEC ANAEROBE CULT: ABNORMAL
BACTERIA SPEC CULT: ABNORMAL
BACTERIA SPEC CULT: ABNORMAL

## 2017-08-29 DIAGNOSIS — E03.9 HYPOTHYROIDISM, UNSPECIFIED TYPE: Chronic | ICD-10-CM

## 2017-08-29 DIAGNOSIS — IMO0001 UNCONTROLLED TYPE 2 DIABETES MELLITUS WITHOUT COMPLICATION, WITHOUT LONG-TERM CURRENT USE OF INSULIN: Primary | ICD-10-CM

## 2017-08-30 PROBLEM — Z01.89 LABORATORY TEST: Status: ACTIVE | Noted: 2017-08-30

## 2017-08-30 LAB
ALBUMIN SERPL-MCNC: 4 G/DL (ref 3.5–5.5)
ALBUMIN/GLOB SERPL: 1.3 {RATIO} (ref 1.2–2.2)
ALP SERPL-CCNC: 50 IU/L (ref 39–117)
ALT SERPL-CCNC: 15 IU/L (ref 0–32)
AST SERPL-CCNC: 14 IU/L (ref 0–40)
BILIRUB SERPL-MCNC: 0.2 MG/DL (ref 0–1.2)
BUN SERPL-MCNC: 5 MG/DL (ref 6–20)
BUN/CREAT SERPL: 9 (ref 9–23)
CALCIUM SERPL-MCNC: 9.4 MG/DL (ref 8.7–10.2)
CHLORIDE SERPL-SCNC: 99 MMOL/L (ref 96–106)
CO2 SERPL-SCNC: 22 MMOL/L (ref 18–29)
CREAT SERPL-MCNC: 0.57 MG/DL (ref 0.57–1)
GLOBULIN SER CALC-MCNC: 3 G/DL (ref 1.5–4.5)
GLUCOSE SERPL-MCNC: 92 MG/DL (ref 65–99)
HBA1C MFR BLD: 6.6 % (ref 4.8–5.6)
POTASSIUM SERPL-SCNC: 4 MMOL/L (ref 3.5–5.2)
PROT SERPL-MCNC: 7 G/DL (ref 6–8.5)
SODIUM SERPL-SCNC: 141 MMOL/L (ref 134–144)
T4 SERPL-MCNC: 13.1 UG/DL (ref 4.5–12)
TSH SERPL DL<=0.005 MIU/L-ACNC: 6.84 UIU/ML (ref 0.45–4.5)

## 2017-08-31 ENCOUNTER — TELEPHONE (OUTPATIENT)
Dept: FAMILY MEDICINE CLINIC | Facility: CLINIC | Age: 32
End: 2017-08-31

## 2017-08-31 DIAGNOSIS — E03.9 HYPOTHYROIDISM, UNSPECIFIED TYPE: Primary | ICD-10-CM

## 2017-08-31 RX ORDER — LEVOTHYROXINE SODIUM 88 UG/1
88 TABLET ORAL DAILY
Qty: 30 TABLET | Refills: 1 | Status: SHIPPED | OUTPATIENT
Start: 2017-08-31 | End: 2017-10-27 | Stop reason: SDUPTHER

## 2017-10-05 ENCOUNTER — RESULTS ENCOUNTER (OUTPATIENT)
Dept: FAMILY MEDICINE CLINIC | Facility: CLINIC | Age: 32
End: 2017-10-05

## 2017-10-05 DIAGNOSIS — E03.9 HYPOTHYROIDISM, UNSPECIFIED TYPE: ICD-10-CM

## 2017-10-30 RX ORDER — LEVOTHYROXINE SODIUM 88 UG/1
TABLET ORAL
Qty: 30 TABLET | Refills: 0 | Status: SHIPPED | OUTPATIENT
Start: 2017-10-30 | End: 2017-11-01 | Stop reason: SDUPTHER

## 2017-10-31 ENCOUNTER — FLU SHOT (OUTPATIENT)
Dept: FAMILY MEDICINE CLINIC | Facility: CLINIC | Age: 32
End: 2017-10-31

## 2017-10-31 ENCOUNTER — LAB (OUTPATIENT)
Dept: FAMILY MEDICINE CLINIC | Facility: CLINIC | Age: 32
End: 2017-10-31

## 2017-10-31 DIAGNOSIS — Z23 NEED FOR INFLUENZA VACCINE: ICD-10-CM

## 2017-10-31 DIAGNOSIS — E03.9 HYPOTHYROIDISM, UNSPECIFIED TYPE: Primary | ICD-10-CM

## 2017-10-31 PROCEDURE — 90686 IIV4 VACC NO PRSV 0.5 ML IM: CPT | Performed by: FAMILY MEDICINE

## 2017-11-01 LAB
T4 SERPL-MCNC: 13.3 UG/DL (ref 4.5–12)
TSH SERPL DL<=0.005 MIU/L-ACNC: 3.59 MIU/ML (ref 0.47–4.68)

## 2017-11-01 RX ORDER — LEVOTHYROXINE SODIUM 88 UG/1
88 TABLET ORAL DAILY
Qty: 30 TABLET | Refills: 5 | Status: SHIPPED | OUTPATIENT
Start: 2017-11-01 | End: 2018-05-24 | Stop reason: SDUPTHER

## 2017-11-29 ENCOUNTER — TELEPHONE (OUTPATIENT)
Dept: FAMILY MEDICINE CLINIC | Facility: CLINIC | Age: 32
End: 2017-11-29

## 2017-11-29 DIAGNOSIS — J32.9 SINUSITIS, UNSPECIFIED CHRONICITY, UNSPECIFIED LOCATION: ICD-10-CM

## 2017-11-29 DIAGNOSIS — R05.9 COUGH: ICD-10-CM

## 2017-11-29 RX ORDER — METHYLPREDNISOLONE 4 MG/1
TABLET ORAL
Qty: 21 TABLET | Refills: 0 | Status: SHIPPED | OUTPATIENT
Start: 2017-11-29 | End: 2017-12-07 | Stop reason: SDUPTHER

## 2017-11-29 RX ORDER — AZITHROMYCIN 250 MG/1
TABLET, FILM COATED ORAL
Qty: 6 TABLET | Refills: 0 | Status: SHIPPED | OUTPATIENT
Start: 2017-11-29 | End: 2017-12-07 | Stop reason: SDUPTHER

## 2017-11-29 NOTE — TELEPHONE ENCOUNTER
PHONE CALL-NURSE       Reginald Hassan  1985          1. Your problem is cough, chest congestion, dry throat, sinus HA    2. Onset first sxs 11/25/17    3. Fever no    4. If yes, taken or felt feverish    5. How high has it gone    6. Coming down now    7. Wants to be seen    8. Prefers per phone yes    9. Would come in if provider requests    10. Rx/Allergy list correct yes    11. Cough dry    12. SOB no    13. Wheezing    14. Nausea/Vomiting no    15. Diarrhea no    16. If female, any chance of pregnancy no    17. If no chance of pregnancy/why on period    18. Breast feeding    19. Anything else they want us to know    20. Pharmacy MD2    [phone]    Verbal per Dr Auguste repeat last and Rx done and sent and pt aware

## 2017-12-07 ENCOUNTER — TELEPHONE (OUTPATIENT)
Dept: FAMILY MEDICINE CLINIC | Facility: CLINIC | Age: 32
End: 2017-12-07

## 2017-12-07 DIAGNOSIS — R05.9 COUGH: ICD-10-CM

## 2017-12-07 DIAGNOSIS — J32.9 SINUSITIS, UNSPECIFIED CHRONICITY, UNSPECIFIED LOCATION: ICD-10-CM

## 2017-12-07 RX ORDER — METHYLPREDNISOLONE 4 MG/1
TABLET ORAL
Qty: 21 TABLET | Refills: 0 | Status: SHIPPED | OUTPATIENT
Start: 2017-12-07 | End: 2017-12-26

## 2017-12-07 RX ORDER — AZITHROMYCIN 250 MG/1
TABLET, FILM COATED ORAL
Qty: 6 TABLET | Refills: 0 | Status: SHIPPED | OUTPATIENT
Start: 2017-12-07 | End: 2017-12-26

## 2017-12-07 NOTE — TELEPHONE ENCOUNTER
Pt came in and filled out nurse communication form and states she has had a cough that is really bad, no voice, body aches and chest hurts when she coughs for about the last week and would like to have something called in [phone] pt has used zpack and medrol in past MD2

## 2017-12-07 NOTE — TELEPHONE ENCOUNTER
Confirm no SOB, uncontrolled fevers, vomiting, diarrhea; patient comfortable symptoms are mild at this point; if this is the case may have refill of last    Along with the Rx you were given today for your upper respiratory infection (URI); feel free to use these other suggestions to help with usual symptoms.   OTC analgesics as needed (tylenol and like)  OTC cough advised (robitussin DM and equal day and nyquil and equal at night)  OTC nasal spray (Afrin/like) 1-3 days advised as needed and no longer than that  OTC saline spray (ocean/nasal) to wash the sinus/nasal passages clear and keep them moist  Vaporizer as needed  Fluids emphasis encouraged; calories optional for few days  Rest till fatigue better

## 2017-12-26 ENCOUNTER — OFFICE VISIT (OUTPATIENT)
Dept: FAMILY MEDICINE CLINIC | Facility: CLINIC | Age: 32
End: 2017-12-26

## 2017-12-26 VITALS
TEMPERATURE: 98.4 F | SYSTOLIC BLOOD PRESSURE: 132 MMHG | RESPIRATION RATE: 18 BRPM | HEART RATE: 102 BPM | WEIGHT: 293 LBS | HEIGHT: 68 IN | OXYGEN SATURATION: 99 % | DIASTOLIC BLOOD PRESSURE: 80 MMHG | BODY MASS INDEX: 44.41 KG/M2

## 2017-12-26 DIAGNOSIS — E53.8 VITAMIN B12 DEFICIENCY: ICD-10-CM

## 2017-12-26 DIAGNOSIS — E03.9 HYPOTHYROIDISM, UNSPECIFIED TYPE: Primary | Chronic | ICD-10-CM

## 2017-12-26 DIAGNOSIS — E66.9 OBESITY (BMI 35.0-39.9 WITHOUT COMORBIDITY): ICD-10-CM

## 2017-12-26 DIAGNOSIS — E11.9 CONTROLLED TYPE 2 DIABETES MELLITUS WITHOUT COMPLICATION, WITHOUT LONG-TERM CURRENT USE OF INSULIN (HCC): ICD-10-CM

## 2017-12-26 DIAGNOSIS — R20.2 PARESTHESIA: ICD-10-CM

## 2017-12-26 PROCEDURE — 99213 OFFICE O/P EST LOW 20 MIN: CPT | Performed by: FAMILY MEDICINE

## 2017-12-26 PROCEDURE — 96372 THER/PROPH/DIAG INJ SC/IM: CPT | Performed by: FAMILY MEDICINE

## 2017-12-26 RX ADMIN — CYANOCOBALAMIN 1000 MCG: 1000 INJECTION, SOLUTION INTRAMUSCULAR; SUBCUTANEOUS at 15:44

## 2017-12-26 NOTE — PATIENT INSTRUCTIONS
Suggest couple times a week minimum and daily if able    1. Before breakfast  2. 2 hrs after breakfast  3. Before lunch  4. 2 hrs after lunch  5. Before dinner/supper  6. 2 hrs after dinner/supper  7. Bedtime

## 2017-12-26 NOTE — PROGRESS NOTES
Subjective   Reginald Hassan is a 32 y.o. female presenting with chief complaint of:   Chief Complaint   Patient presents with   • Hypothyroidism   • Diabetes       History of Present Illness :  With son.  Has chronic problems; hypothyroidism.  Replaced; here to review.     Other chronic problem/s to consider:   DM2: This has been present for years/over a year.  It is chronic.  It is generally controlled.  Home accuchecks infrequently, sporatic, rarely run.  No hypoglycemia manifest as syncope/near syncope or diaphoretic/sweating episodes.  A1c below if available.  Diet loose.   Hypothyroidism/abnormal thyroid lab: This has been present for years/over a year.  It is chronic.  It is stable as there has been stable labs and energy level, hair-skin texture, stooling, are all the same; no palpitations.. Compliant with Rx; labs are needed periodic to monitor stability.   Obesity/overweight: This has been present for years/over a year.  It is chronic.     It is stable; there has been no recent major change in weight, or dieting  Associated illnesses noted that are affected by this illness. Offered obesity surgery referral.   B12 def:  Chronic/years ago.  Lowish; concerning for low enough with   Has an/another acute issue today: none.    The following portions of the patient's history were reviewed and updated as appropriate: allergies, current medications, past family history, past medical history, past social history, past surgical history and problem list.  Records acquired and reviewed; TCC migrated.      Current Outpatient Prescriptions:   •  glyBURIDE (DIABETA) 5 MG tablet, Take 1 tablet by mouth 2 (Two) Times a Day With Meals., Disp: 60 tablet, Rfl: 5  •  levothyroxine (SYNTHROID, LEVOTHROID) 88 MCG tablet, Take 1 tablet by mouth Daily., Disp: 30 tablet, Rfl: 5  •  linagliptin (TRADJENTA) 5 MG tablet tablet, Take 1 tablet by mouth Daily., Disp: 30 tablet, Rfl: 5  •  metFORMIN (GLUCOPHAGE) 1000 MG tablet, Take 1 tablet  by mouth 2 (Two) Times a Day With Meals., Disp: 60 tablet, Rfl: 5  •  naproxen (NAPROSYN) 500 MG tablet, Take 500 mg by mouth 2 (Two) Times a Day As Needed., Disp: , Rfl:   •  norgestimate-ethinyl estradiol (SPRINTEC 28) 0.25-35 MG-MCG per tablet, Take 1 tablet by mouth Daily., Disp: 3 package, Rfl: 3    Current Facility-Administered Medications:   •  cyanocobalamin injection 1,000 mcg, 1,000 mcg, Intramuscular, Q30 Days, Theo Auguste MD, 1,000 mcg at 12/26/17 1544    No Known Allergies    Review of Systems  GENERAL:  Active/slower with limits, speed, stamina for age; 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 Sleep is ok; apnea denied. No fever now.  ENDO:  No syncope, near or diaphoretic sweaty spells.  HEENT: No head injury  headache,  No vision change, No hearing loss.  Ears without pain/drainage.  No sore throat.  No nasal/sinus congestion/drainage. No epistaxis.  CHEST: No chest wall tenderness or mass. No cough, without wheeze.  No SOB; no hemoptysis.  CV: No chest pain, palpitations, ankle edema.  GI: No heartburn, dysphagia.  No abdominal pain, diarrhea, constipation.  No rectal bleeding, or melena.    :  Voids without dysuria, or incontinence to completion.  ORTHO: No painful/swollen joints but various on /off sore.  No sore neck or back.  No acute neck or back pain without recent injury.   NEURO: No dizziness, weakness of extremities.  No change occ numbness/paresthesias.   PSYCH: No memory loss.  Mood good; anxious, depressed but/and not suicidal.  Tries to tolerate stress .     Results for orders placed or performed in visit on 10/31/17   TSH   Result Value Ref Range    TSH 3.590 0.470 - 4.680 mIU/mL   T4   Result Value Ref Range    T4, Total 13.3 (H) 4.5 - 12.0 ug/dL       Lab Results   Component Value Date    HGBA1C 6.6 (H) 08/29/2017    HGBA1C 9.1 (H) 06/13/2017    HGBA1C 6.6 (H) 08/22/2014       Lab Results   Component Value Date     08/29/2017     06/13/2017      "08/22/2014    K 4.0 08/29/2017    K 4.2 06/13/2017    K 3.7 08/22/2014    CL 99 08/29/2017    CL 98 06/13/2017     08/22/2014    CO2 22 08/29/2017    CO2 22 06/13/2017    CO2 23 (L) 08/22/2014    GLUCOSE 132 (H) 08/22/2014    BUN 5 (L) 08/29/2017    BUN 6 06/13/2017    BUN 3 (L) 08/22/2014    CREATININE 0.57 08/29/2017    CREATININE 0.62 06/13/2017    CREATININE 0.69 08/22/2014    CALCIUM 9.4 08/29/2017    CALCIUM 9.0 06/13/2017    CALCIUM 9.0 08/22/2014       No results found for: PSA     Lab Results:  CBC:    Lab Results - Last 18 Months  Lab Units 06/13/17  1038   WBC x10E3/uL 6.6   HEMATOCRIT % 37.0     CMP:    Lab Results - Last 18 Months  Lab Units 08/29/17  0714 06/13/17  1038   SODIUM mmol/L 141 137   CHLORIDE mmol/L 99 98   TOTAL CO2, ARTERIAL mmol/L 22 22   BUN mg/dL 5* 6   CREATININE mg/dL 0.57 0.62   EGFR IF NONAFRICN AM mL/min/1.73 124 121   EGFR IF AFRICN AM mL/min/1.73 143 139   CALCIUM mg/dL 9.4 9.0     THYROID:    Lab Results - Last 18 Months  Lab Units 10/31/17  1402 08/29/17  0714 06/13/17  1038   TSH mIU/mL 3.590 6.840* 4.780*     A1C:    Lab Results - Last 18 Months  Lab Units 08/29/17  0714 06/13/17  1038   HEMOGLOBIN A1C % 6.6* 9.1*       Objective   /80  Pulse 102  Temp 98.4 °F (36.9 °C) (Oral)   Resp 18  Ht 172.7 cm (68\")  Wt (!) 143 kg (315 lb)  LMP 12/19/2017 (Exact Date)  SpO2 99%  Breastfeeding? No  BMI 47.9 kg/m2    Physical Exam  GENERAL:  Well nourished/developed in no acute distress. Obese   SKIN: Turgor excellent, without wound, rash, lesion.  HEENT: Normal cephalic without trauma.  Pupils equal round reactive to light. Extraocular motions full without nystagmus.   External canals nonobstructive nontender without reddness. Tymphatic membranes tomeka with bryson structures intact.   Oral cavity without growths, exudates, and moist.  Posterior pharynx without mass, obstruction, redness.  No thyromegaly, mass, tenderness, lymphadenopathy and supple.  CV: Regular " rhythm.  No murmur, gallop,  edema. Posterior pulses intact.  No carotid bruits.  CHEST: No chest wall tenderness or mass.   LUNGS: Symmetric motion with clear to auscultation.  No dullness to percussion  ABD: Soft, nontender without mass.   ORTHO: Symmetric extremities without swelling/point tenderness.  Full gross range of motion.  NEURO: CN 2-12 grossly intact.  Symmetric facies. 1/4 x bicep knee equal reflexes.  UE/LE   4/5 strength throughout.  Nonfocal use extremities. Speech clear.  Reduced light touch with monofilament, vibratory sensation with tuning fork; equal toes/distal feet.    PSYCH: Oriented x 3.  Pleasant calm, well kept.  Purposeful/directed conservation with intact short/long gross memory.     Assessment/Plan     1. Hypothyroidism, unspecified type    2. Vitamin B12 deficiency    3. Controlled type 2 diabetes mellitus without complication, without long-term current use of insulin    4. Obesity-offered referral surgery    5. Paresthesia-carpal tunnel vs B12?  -probably carpal tunnel sxs.        Rx: reviewed/changes:  same    LAB: reviewed/orders:   6m TSH, T4, A1c  12m CBC, CMP, A1c,  LIPID, TSH, T4, B12, folate    Discussions:   accuchecks twice a week    Patient Instructions   Suggest couple times a week minimum and daily if able    1. Before breakfast  2. 2 hrs after breakfast  3. Before lunch  4. 2 hrs after lunch  5. Before dinner/supper  6. 2 hrs after dinner/supper  7. Bedtime          Follow up: Return for lab 2.2017;  , lab during/or just before next apt;, Dr Auguste-6m.  Future Appointments  Date Time Provider Department [place]   2/21/2018 8:20 AM [facility] [place] [facility] None   7/9/2018 8:25 AM [facility] [place] [facility] None   7/11/2018 11:45 AM Theo Auguste MD [facility] None

## 2018-02-12 DIAGNOSIS — IMO0001 UNCONTROLLED TYPE 2 DIABETES MELLITUS WITHOUT COMPLICATION, WITHOUT LONG-TERM CURRENT USE OF INSULIN: ICD-10-CM

## 2018-02-12 RX ORDER — LINAGLIPTIN 5 MG/1
TABLET, FILM COATED ORAL
Qty: 30 TABLET | Refills: 5 | Status: SHIPPED | OUTPATIENT
Start: 2018-02-12 | End: 2018-08-25 | Stop reason: SDUPTHER

## 2018-02-16 DIAGNOSIS — E03.9 HYPOTHYROIDISM, UNSPECIFIED TYPE: Primary | Chronic | ICD-10-CM

## 2018-02-16 DIAGNOSIS — IMO0001 UNCONTROLLED TYPE 2 DIABETES MELLITUS WITHOUT COMPLICATION, WITHOUT LONG-TERM CURRENT USE OF INSULIN: ICD-10-CM

## 2018-02-21 ENCOUNTER — RESULTS ENCOUNTER (OUTPATIENT)
Dept: FAMILY MEDICINE CLINIC | Facility: CLINIC | Age: 33
End: 2018-02-21

## 2018-02-21 DIAGNOSIS — E03.9 HYPOTHYROIDISM, UNSPECIFIED TYPE: Chronic | ICD-10-CM

## 2018-02-21 DIAGNOSIS — IMO0001 UNCONTROLLED TYPE 2 DIABETES MELLITUS WITHOUT COMPLICATION, WITHOUT LONG-TERM CURRENT USE OF INSULIN: ICD-10-CM

## 2018-04-04 ENCOUNTER — OFFICE VISIT (OUTPATIENT)
Dept: OBSTETRICS AND GYNECOLOGY | Facility: CLINIC | Age: 33
End: 2018-04-04

## 2018-04-04 VITALS
WEIGHT: 293 LBS | DIASTOLIC BLOOD PRESSURE: 80 MMHG | SYSTOLIC BLOOD PRESSURE: 144 MMHG | HEIGHT: 68 IN | BODY MASS INDEX: 44.41 KG/M2

## 2018-04-04 DIAGNOSIS — Z01.419 ENCOUNTER FOR GYNECOLOGICAL EXAMINATION WITHOUT ABNORMAL FINDING: Primary | ICD-10-CM

## 2018-04-04 DIAGNOSIS — Z30.41 ENCOUNTER FOR SURVEILLANCE OF CONTRACEPTIVE PILLS: ICD-10-CM

## 2018-04-04 DIAGNOSIS — Z78.9 NON-SMOKER: ICD-10-CM

## 2018-04-04 PROCEDURE — 87798 DETECT AGENT NOS DNA AMP: CPT | Performed by: NURSE PRACTITIONER

## 2018-04-04 PROCEDURE — 1036F TOBACCO NON-USER: CPT | Performed by: NURSE PRACTITIONER

## 2018-04-04 PROCEDURE — 99395 PREV VISIT EST AGE 18-39: CPT | Performed by: NURSE PRACTITIONER

## 2018-04-04 PROCEDURE — 87512 GARDNER VAG DNA QUANT: CPT | Performed by: NURSE PRACTITIONER

## 2018-04-04 PROCEDURE — 87481 CANDIDA DNA AMP PROBE: CPT | Performed by: NURSE PRACTITIONER

## 2018-04-04 PROCEDURE — G0123 SCREEN CERV/VAG THIN LAYER: HCPCS | Performed by: NURSE PRACTITIONER

## 2018-04-04 PROCEDURE — 87661 TRICHOMONAS VAGINALIS AMPLIF: CPT | Performed by: NURSE PRACTITIONER

## 2018-04-04 PROCEDURE — 3008F BODY MASS INDEX DOCD: CPT | Performed by: NURSE PRACTITIONER

## 2018-04-04 PROCEDURE — 87624 HPV HI-RISK TYP POOLED RSLT: CPT | Performed by: NURSE PRACTITIONER

## 2018-04-04 PROCEDURE — 87591 N.GONORRHOEAE DNA AMP PROB: CPT | Performed by: NURSE PRACTITIONER

## 2018-04-04 PROCEDURE — 87491 CHLMYD TRACH DNA AMP PROBE: CPT | Performed by: NURSE PRACTITIONER

## 2018-04-04 RX ORDER — NORGESTIMATE AND ETHINYL ESTRADIOL 0.25-0.035
1 KIT ORAL DAILY
Qty: 3 PACKAGE | Refills: 3 | Status: SHIPPED | OUTPATIENT
Start: 2018-04-04 | End: 2019-03-20 | Stop reason: SDUPTHER

## 2018-04-04 NOTE — PROGRESS NOTES
"Subjective   Reginald Hassan is a 32 y.o. female.     Annual exam.     Pt reports Bartholin's cyst increases in size and tenderness during periods. Pt reports no current problems.    The following portions of the patient's history were reviewed and updated as appropriate: allergies, current medications, past family history, past medical history, past social history, past surgical history and problem list.    /80 (BP Location: Left arm, Patient Position: Sitting, Cuff Size: Large Adult)   Ht 172.7 cm (68\")   Wt (!) 142 kg (312 lb)   LMP 03/15/2018 (Exact Date)   Breastfeeding? No   BMI 47.44 kg/m²     Review of Systems   Constitutional: Negative for activity change, appetite change, fatigue and fever.   HENT: Negative for congestion, sore throat and trouble swallowing.    Eyes: Negative for pain, discharge and visual disturbance.   Respiratory: Negative for apnea, shortness of breath and wheezing.    Cardiovascular: Negative for chest pain, palpitations and leg swelling.   Gastrointestinal: Negative for abdominal pain, constipation and diarrhea.   Genitourinary: Positive for vaginal discharge. Negative for dyspareunia, dysuria, frequency, hematuria, pelvic pain, urgency and vaginal pain. Menstrual problem: periods are only 3-4 days, and are during last wk of pill pack.   Musculoskeletal: Negative for back pain and gait problem.   Skin: Negative for color change and rash.   Neurological: Negative for dizziness, weakness and numbness.   Psychiatric/Behavioral: Negative for confusion and sleep disturbance.       Objective   Physical Exam   Constitutional: She is oriented to person, place, and time. She appears well-developed and well-nourished. No distress.   HENT:   Head: Normocephalic.   Right Ear: External ear normal.   Left Ear: External ear normal.   Nose: Nose normal.   Mouth/Throat: Oropharynx is clear and moist.   Eyes: Conjunctivae are normal. Right eye exhibits no discharge. Left eye exhibits no " discharge. No scleral icterus.   Neck: Normal range of motion. Neck supple. Carotid bruit is not present. No tracheal deviation present. No thyromegaly present.   Cardiovascular: Normal rate, regular rhythm, normal heart sounds and intact distal pulses.    No murmur heard.  Pulmonary/Chest: Effort normal and breath sounds normal. No respiratory distress. She has no wheezes. Right breast exhibits no inverted nipple, no mass, no nipple discharge, no skin change and no tenderness. Left breast exhibits no inverted nipple, no mass, no nipple discharge, no skin change and no tenderness. Breasts are symmetrical. There is no breast swelling.   Abdominal: Soft. She exhibits no distension and no mass. There is no tenderness. There is no guarding. No hernia. Hernia confirmed negative in the right inguinal area and confirmed negative in the left inguinal area.   Genitourinary: Rectum normal and uterus normal. Rectal exam shows no mass. No breast tenderness, discharge or bleeding. Pelvic exam was performed with patient supine. There is no rash, tenderness, lesion or injury on the right labia. There is no rash, tenderness, lesion or injury on the left labia. Uterus is not enlarged, not fixed and not tender. Cervix exhibits no motion tenderness, no discharge and no friability. Right adnexum displays no mass, no tenderness and no fullness. Left adnexum displays no mass, no tenderness and no fullness. No erythema, tenderness or bleeding in the vagina. No foreign body in the vagina. No signs of injury around the vagina. Vaginal discharge found.   Genitourinary Comments:   BSU normal  Urethral meatus  Normal  Perineum  Normal   Musculoskeletal: Normal range of motion. She exhibits no edema or tenderness.   Lymphadenopathy:        Head (right side): No submental, no submandibular, no tonsillar, no preauricular, no posterior auricular and no occipital adenopathy present.        Head (left side): No submental, no submandibular, no  tonsillar, no preauricular, no posterior auricular and no occipital adenopathy present.     She has no cervical adenopathy.        Right cervical: No superficial cervical, no deep cervical and no posterior cervical adenopathy present.       Left cervical: No superficial cervical, no deep cervical and no posterior cervical adenopathy present.     She has no axillary adenopathy.        Right: No inguinal adenopathy present.        Left: No inguinal adenopathy present.   Neurological: She is alert and oriented to person, place, and time. Coordination normal.   Skin: Skin is warm and dry. No bruising and no rash noted. She is not diaphoretic. No erythema.   Psychiatric: She has a normal mood and affect. Her behavior is normal. Judgment and thought content normal.   Nursing note and vitals reviewed.      Assessment/Plan    Well woman exam. Pap collected, BV panel, gonorrhea and chlamydia testing added.   Pt reports annual labs followed by PCP.     Discussed pt OCP and her glucose regulation. Encouraged considering a different BC to see if it helped with glucose regulation. Pt declined at this time and opts to further discuss with PCP.     Discussed pt hx of Bartholin's cyst. No swelling or tenderness present with exam. Pt informed to schedule appt when the area is bothersome. Also collected specimen to evaluate for vaginal infection.   RV annual exam/prn.    Reginald was seen today for gynecologic exam.    Diagnoses and all orders for this visit:    Encounter for gynecological examination without abnormal finding  -     Liquid-based Pap Smear, Screening    Encounter for surveillance of contraceptive pills    BMI 45.0-49.9, adult    Non-smoker    Other orders  -     norgestimate-ethinyl estradiol (SPRINTEC 28) 0.25-35 MG-MCG per tablet; Take 1 tablet by mouth Daily.

## 2018-04-04 NOTE — PATIENT INSTRUCTIONS

## 2018-04-11 LAB
GEN CATEG CVX/VAG CYTO-IMP: NORMAL
LAB AP CASE REPORT: NORMAL
LAB AP GYN ADDITIONAL INFORMATION: NORMAL
LAB AP GYN OTHER FINDINGS: NORMAL
Lab: NORMAL
PATH INTERP SPEC-IMP: NORMAL
STAT OF ADQ CVX/VAG CYTO-IMP: NORMAL

## 2018-04-11 RX ORDER — METRONIDAZOLE 500 MG/1
500 TABLET ORAL 2 TIMES DAILY
Qty: 14 TABLET | Refills: 0 | Status: SHIPPED | OUTPATIENT
Start: 2018-04-11 | End: 2018-04-18

## 2018-05-22 DIAGNOSIS — IMO0001 UNCONTROLLED TYPE 2 DIABETES MELLITUS WITHOUT COMPLICATION, WITHOUT LONG-TERM CURRENT USE OF INSULIN: ICD-10-CM

## 2018-05-22 DIAGNOSIS — E03.9 HYPOTHYROIDISM, UNSPECIFIED TYPE: Chronic | ICD-10-CM

## 2018-05-22 DIAGNOSIS — E53.8 VITAMIN B12 DEFICIENCY: ICD-10-CM

## 2018-05-22 DIAGNOSIS — L40.9 PSORIASIS: ICD-10-CM

## 2018-05-22 DIAGNOSIS — Z01.89 LABORATORY TEST: Primary | ICD-10-CM

## 2018-05-22 DIAGNOSIS — Z00.00 WELLNESS EXAMINATION: ICD-10-CM

## 2018-05-23 ENCOUNTER — RESULTS ENCOUNTER (OUTPATIENT)
Dept: FAMILY MEDICINE CLINIC | Facility: CLINIC | Age: 33
End: 2018-05-23

## 2018-05-23 ENCOUNTER — CLINICAL SUPPORT (OUTPATIENT)
Dept: FAMILY MEDICINE CLINIC | Facility: CLINIC | Age: 33
End: 2018-05-23

## 2018-05-23 DIAGNOSIS — IMO0001 UNCONTROLLED TYPE 2 DIABETES MELLITUS WITHOUT COMPLICATION, WITHOUT LONG-TERM CURRENT USE OF INSULIN: ICD-10-CM

## 2018-05-23 DIAGNOSIS — E03.9 HYPOTHYROIDISM, UNSPECIFIED TYPE: Chronic | ICD-10-CM

## 2018-05-23 DIAGNOSIS — Z00.00 WELLNESS EXAMINATION: ICD-10-CM

## 2018-05-23 DIAGNOSIS — E53.8 VITAMIN B12 DEFICIENCY: ICD-10-CM

## 2018-05-23 DIAGNOSIS — Z01.89 LABORATORY TEST: ICD-10-CM

## 2018-05-23 DIAGNOSIS — L40.9 PSORIASIS: ICD-10-CM

## 2018-05-23 PROCEDURE — 96372 THER/PROPH/DIAG INJ SC/IM: CPT | Performed by: FAMILY MEDICINE

## 2018-05-23 RX ADMIN — CYANOCOBALAMIN 1000 MCG: 1000 INJECTION, SOLUTION INTRAMUSCULAR; SUBCUTANEOUS at 08:40

## 2018-05-24 ENCOUNTER — TELEPHONE (OUTPATIENT)
Dept: FAMILY MEDICINE CLINIC | Facility: CLINIC | Age: 33
End: 2018-05-24

## 2018-05-24 LAB
ALBUMIN SERPL-MCNC: 3.7 G/DL (ref 3.5–5)
ALBUMIN/GLOB SERPL: 1.1 G/DL (ref 1.1–2.5)
ALP SERPL-CCNC: 68 U/L (ref 24–120)
ALT SERPL-CCNC: 20 U/L (ref 0–54)
AST SERPL-CCNC: 13 U/L (ref 7–45)
BASOPHILS # BLD AUTO: 0.02 10*3/MM3 (ref 0–0.2)
BASOPHILS NFR BLD AUTO: 0.3 % (ref 0–2)
BILIRUB SERPL-MCNC: 0.3 MG/DL (ref 0.1–1)
BUN SERPL-MCNC: 6 MG/DL (ref 5–21)
BUN/CREAT SERPL: 10.7 (ref 7–25)
CALCIUM SERPL-MCNC: 9.2 MG/DL (ref 8.4–10.4)
CHLORIDE SERPL-SCNC: 102 MMOL/L (ref 98–110)
CHOLEST SERPL-MCNC: 176 MG/DL (ref 130–200)
CO2 SERPL-SCNC: 26 MMOL/L (ref 24–31)
CREAT SERPL-MCNC: 0.56 MG/DL (ref 0.5–1.4)
EOSINOPHIL # BLD AUTO: 0.27 10*3/MM3 (ref 0–0.7)
EOSINOPHIL NFR BLD AUTO: 3.7 % (ref 0–4)
ERYTHROCYTE [DISTWIDTH] IN BLOOD BY AUTOMATED COUNT: 15 % (ref 12–15)
FOLATE SERPL-MCNC: 9.77 NG/ML
GFR SERPLBLD CREATININE-BSD FMLA CKD-EPI: 125 ML/MIN/1.73
GFR SERPLBLD CREATININE-BSD FMLA CKD-EPI: >150 ML/MIN/1.73
GLOBULIN SER CALC-MCNC: 3.3 GM/DL
GLUCOSE SERPL-MCNC: 97 MG/DL (ref 70–100)
HBA1C MFR BLD: 7.5 %
HCT VFR BLD AUTO: 36.6 % (ref 37–47)
HDLC SERPL-MCNC: 49 MG/DL
HGB BLD-MCNC: 11.5 G/DL (ref 12–16)
IMM GRANULOCYTES # BLD: 0.03 10*3/MM3 (ref 0–0.03)
IMM GRANULOCYTES NFR BLD: 0.4 % (ref 0–5)
LDLC SERPL CALC-MCNC: 102 MG/DL (ref 0–99)
LYMPHOCYTES # BLD AUTO: 2.64 10*3/MM3 (ref 0.72–4.86)
LYMPHOCYTES NFR BLD AUTO: 36 % (ref 15–45)
MCH RBC QN AUTO: 25.2 PG (ref 28–32)
MCHC RBC AUTO-ENTMCNC: 31.4 G/DL (ref 33–36)
MCV RBC AUTO: 80.1 FL (ref 82–98)
MICROALBUMIN UR-MCNC: 93.9 UG/ML
MONOCYTES # BLD AUTO: 0.39 10*3/MM3 (ref 0.19–1.3)
MONOCYTES NFR BLD AUTO: 5.3 % (ref 4–12)
NEUTROPHILS # BLD AUTO: 3.99 10*3/MM3 (ref 1.87–8.4)
NEUTROPHILS NFR BLD AUTO: 54.3 % (ref 39–78)
NRBC BLD AUTO-RTO: 0 /100 WBC (ref 0–0)
PLATELET # BLD AUTO: 362 10*3/MM3 (ref 130–400)
POTASSIUM SERPL-SCNC: 4.4 MMOL/L (ref 3.5–5.3)
PROT SERPL-MCNC: 7 G/DL (ref 6.3–8.7)
RBC # BLD AUTO: 4.57 10*6/MM3 (ref 4.2–5.4)
SODIUM SERPL-SCNC: 140 MMOL/L (ref 135–145)
T4 SERPL-MCNC: 11.8 UG/DL (ref 4.5–12)
TRIGL SERPL-MCNC: 124 MG/DL (ref 0–149)
TSH SERPL DL<=0.005 MIU/L-ACNC: 7.79 MIU/ML (ref 0.47–4.68)
VIT B12 SERPL-MCNC: >1000 PG/ML (ref 239–931)
VLDLC SERPL CALC-MCNC: 24.8 MG/DL
WBC # BLD AUTO: 7.34 10*3/MM3 (ref 4.8–10.8)

## 2018-05-24 RX ORDER — LEVOTHYROXINE SODIUM 88 UG/1
88 TABLET ORAL DAILY
Qty: 30 TABLET | Refills: 5 | Status: SHIPPED | OUTPATIENT
Start: 2018-05-24 | End: 2019-01-10 | Stop reason: SDUPTHER

## 2018-06-04 NOTE — PROGRESS NOTES
Pt informed ok, but be aware bs moving upward. No change at least until through next appt Transposition Flap Text: The defect edges were debeveled with a #15 scalpel blade.  Given the location of the defect and the proximity to free margins a transposition flap was deemed most appropriate.  Using a sterile surgical marker, an appropriate transposition flap was drawn incorporating the defect.    The area thus outlined was incised deep to adipose tissue with a #15 scalpel blade.  The skin margins were undermined to an appropriate distance in all directions utilizing iris scissors.

## 2018-06-05 DIAGNOSIS — IMO0001 UNCONTROLLED TYPE 2 DIABETES MELLITUS WITHOUT COMPLICATION, WITHOUT LONG-TERM CURRENT USE OF INSULIN: ICD-10-CM

## 2018-06-05 RX ORDER — GLYBURIDE 5 MG/1
TABLET ORAL
Qty: 60 TABLET | Refills: 5 | Status: SHIPPED | OUTPATIENT
Start: 2018-06-05 | End: 2019-04-08

## 2018-07-11 ENCOUNTER — OFFICE VISIT (OUTPATIENT)
Dept: FAMILY MEDICINE CLINIC | Facility: CLINIC | Age: 33
End: 2018-07-11

## 2018-07-11 VITALS
OXYGEN SATURATION: 98 % | HEART RATE: 112 BPM | TEMPERATURE: 98.7 F | WEIGHT: 293 LBS | HEIGHT: 68 IN | SYSTOLIC BLOOD PRESSURE: 120 MMHG | DIASTOLIC BLOOD PRESSURE: 90 MMHG | BODY MASS INDEX: 44.41 KG/M2 | RESPIRATION RATE: 18 BRPM

## 2018-07-11 DIAGNOSIS — E53.8 VITAMIN B12 DEFICIENCY: Primary | ICD-10-CM

## 2018-07-11 DIAGNOSIS — E11.9 CONTROLLED TYPE 2 DIABETES MELLITUS WITHOUT COMPLICATION, WITHOUT LONG-TERM CURRENT USE OF INSULIN (HCC): ICD-10-CM

## 2018-07-11 DIAGNOSIS — E66.9 OBESITY (BMI 35.0-39.9 WITHOUT COMORBIDITY): ICD-10-CM

## 2018-07-11 DIAGNOSIS — E03.9 HYPOTHYROIDISM, UNSPECIFIED TYPE: Chronic | ICD-10-CM

## 2018-07-11 PROCEDURE — 96372 THER/PROPH/DIAG INJ SC/IM: CPT | Performed by: FAMILY MEDICINE

## 2018-07-11 PROCEDURE — 99213 OFFICE O/P EST LOW 20 MIN: CPT | Performed by: FAMILY MEDICINE

## 2018-07-11 RX ADMIN — CYANOCOBALAMIN 1000 MCG: 1000 INJECTION, SOLUTION INTRAMUSCULAR; SUBCUTANEOUS at 12:33

## 2018-07-11 NOTE — PATIENT INSTRUCTIONS
Your A1c today is 7.5    A1c values:   <5.5 what we see on a normal/non-diabetic person  6.5 what it takes to be diagnosed with diabetes AND what most endocrinologist recommend if you are a diabetic  7.5 what the American diabetic association says you should be.

## 2018-07-11 NOTE — PROGRESS NOTES
Subjective   Reginald Hassan is a 32 y.o. female presenting with chief complaint of:   Chief Complaint   Patient presents with   • Diabetes     Patient is here today for a 6 month follow-up and vit b12 injection.   • Hypothyroidism       History of Present Illness :  With son.   Has multiple chronic problems to consider that might have a bearing on today's issues;  an interval appointment.       Chronic/acute problems to review today:   1. Vitamin B12 deficiency-lowish with paresthesia: chronic/stable as discovered and now treated.     2. Controlled type 2 diabetes mellitus without complication, without long-term current use of insulin (CMS/[facility]): chronic/stable without significant sweating, suggestion for hypoglycemia.     3. Hypothyroidism, unspecified type: chronic/stale with long term replacement.    4. Obesity-offered referral surgery: chronic/worsens slowly generally with pattern weight gain or no loss.      Has an/another acute issue today: none.    The following portions of the patient's history were reviewed and updated as appropriate: allergies, current medications, past family history, past medical history, past social history, past surgical history and problem list.  Records acquired and reviewed; TCC migrated.      Current Outpatient Prescriptions:   •  glyBURIDE (DIAbeta) 5 MG tablet, TAKE ONE TABLET TWICE DAILY WITH FOOD, Disp: 60 tablet, Rfl: 5  •  levothyroxine (SYNTHROID, LEVOTHROID) 88 MCG tablet, Take 1 tablet by mouth Daily., Disp: 30 tablet, Rfl: 5  •  metFORMIN (GLUCOPHAGE) 1000 MG tablet, Take 1 tablet by mouth 2 (Two) Times a Day With Meals., Disp: 60 tablet, Rfl: 5  •  norgestimate-ethinyl estradiol (SPRINTEC 28) 0.25-35 MG-MCG per tablet, Take 1 tablet by mouth Daily., Disp: 3 package, Rfl: 3  •  TRADJENTA 5 MG tablet tablet, TAKE ONE TABLET DAILY, Disp: 30 tablet, Rfl: 5    Current Facility-Administered Medications:   •  cyanocobalamin injection 1,000 mcg, 1,000 mcg, Intramuscular, Q30 Days,  Theo Auguste MD, 1,000 mcg at 07/11/18 1233    No problems with medications.  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 Sleep is ok; apnea denied. No fever now.  ENDO:  No syncope, near or diaphoretic sweaty spells.  HEENT: No head injury  headache,  No vision change, No hearing loss.  Ears without pain/drainage.  No sore throat.  No nasal/sinus congestion/drainage. No epistaxis.  CHEST: No chest wall tenderness or mass. No cough, without wheeze.  No SOB; no hemoptysis.  CV: No chest pain, palpitations, ankle edema.  GI: No heartburn, dysphagia.  No abdominal pain, diarrhea, constipation.  No rectal bleeding, or melena.    :  Voids without dysuria, or incontinence to completion.  ORTHO: No painful/swollen joints but various on /off sore.  No sore neck or back.  No acute neck or back pain without recent injury.   NEURO: No dizziness, weakness of extremities.  No change occ numbness/paresthesias.   PSYCH: No memory loss.  Mood good; anxious, depressed but/and not suicidal.  Tries to tolerate stress .     Screening:  Mammogram: long ago-maybe 2019  Bone density: NA  Low dose CT chest: NA  GI: NA    Results for orders placed or performed in visit on 05/23/18   Hemoglobin A1c   Result Value Ref Range    Hemoglobin A1C 7.50 %   MicroAlbumin, Urine, Random - Urine, Clean Catch   Result Value Ref Range    Microalbumin, Urine 93.9 Not Estab. ug/mL   Vitamin B12   Result Value Ref Range    Vitamin B-12 >1000 (H) 239 - 931 pg/mL   Folate   Result Value Ref Range    Folate 9.77 >2.75 ng/mL   Comprehensive metabolic panel   Result Value Ref Range    Glucose 97 70 - 100 mg/dL    BUN 6 5 - 21 mg/dL    Creatinine 0.56 0.50 - 1.40 mg/dL    eGFR Non African Am 125 >60 mL/min/1.73    eGFR African Am >150 >60 mL/min/1.73    BUN/Creatinine Ratio 10.7 7.0 - 25.0    Sodium 140 135 - 145 mmol/L     Potassium 4.4 3.5 - 5.3 mmol/L    Chloride 102 98 - 110 mmol/L    Total CO2 26.0 24.0 - 31.0 mmol/L    Calcium 9.2 8.4 - 10.4 mg/dL    Total Protein 7.0 6.3 - 8.7 g/dL    Albumin 3.70 3.50 - 5.00 g/dL    Globulin 3.3 gm/dL    A/G Ratio 1.1 1.1 - 2.5 g/dL    Total Bilirubin 0.3 0.1 - 1.0 mg/dL    Alkaline Phosphatase 68 24 - 120 U/L    AST (SGOT) 13 7 - 45 U/L    ALT (SGPT) 20 0 - 54 U/L   Lipid panel   Result Value Ref Range    Total Cholesterol 176 130 - 200 mg/dL    Triglycerides 124 0 - 149 mg/dL    HDL Cholesterol 49 (L) >=50 mg/dL    VLDL Cholesterol 24.8 mg/dL    LDL Cholesterol  102 (H) 0 - 99 mg/dL   T4   Result Value Ref Range    T4, Total 11.8 4.5 - 12.0 ug/dL   TSH   Result Value Ref Range    TSH 7.790 (H) 0.470 - 4.680 mIU/mL   CBC and Differential   Result Value Ref Range    WBC 7.34 4.80 - 10.80 10*3/mm3    RBC 4.57 4.20 - 5.40 10*6/mm3    Hemoglobin 11.5 (L) 12.0 - 16.0 g/dL    Hematocrit 36.6 (L) 37.0 - 47.0 %    MCV 80.1 (L) 82.0 - 98.0 fL    MCH 25.2 (L) 28.0 - 32.0 pg    MCHC 31.4 (L) 33.0 - 36.0 g/dL    RDW 15.0 12.0 - 15.0 %    Platelets 362 130 - 400 10*3/mm3    Neutrophil Rel % 54.3 39.0 - 78.0 %    Lymphocyte Rel % 36.0 15.0 - 45.0 %    Monocyte Rel % 5.3 4.0 - 12.0 %    Eosinophil Rel % 3.7 0.0 - 4.0 %    Basophil Rel % 0.3 0.0 - 2.0 %    Neutrophils Absolute 3.99 1.87 - 8.40 10*3/mm3    Lymphocytes Absolute 2.64 0.72 - 4.86 10*3/mm3    Monocytes Absolute 0.39 0.19 - 1.30 10*3/mm3    Eosinophils Absolute 0.27 0.00 - 0.70 10*3/mm3    Basophils Absolute 0.02 0.00 - 0.20 10*3/mm3    Immature Granulocyte Rel % 0.4 0.0 - 5.0 %    Immature Grans Absolute 0.03 0.00 - 0.03 10*3/mm3    nRBC 0.0 0.0 - 0.0 /100 WBC       No results found for: PSA     Lab Results:  CBC:    Lab Results - Last 18 Months  Lab Units 05/23/18  0741 06/13/17  1038   WBC x10E3/uL  --  6.6   HEMOGLOBIN g/dL 11.5* 12.2   HEMATOCRIT % 36.6* 37.0   PLATELETS 10*3/mm3 362 319      BMP/CMP:    Lab Results - Last 18 Months  Lab Units  "05/23/18  0741 08/29/17  0714 06/13/17  1038   SODIUM mmol/L 140 141 137   POTASSIUM mmol/L 4.4 4.0 4.2   CHLORIDE mmol/L 102 99 98   TOTAL CO2, ARTERIAL mmol/L 26.0 22 22   GLUCOSE mg/dL 97 92 193*   BUN mg/dL 6 5* 6   CREATININE mg/dL 0.56 0.57 0.62   EGFR IF NONAFRICN AM mL/min/1.73 125 124 121   EGFR IF AFRICN AM mL/min/1.73 >150 143 139   CALCIUM mg/dL 9.2 9.4 9.0     HEPATIC:    Lab Results - Last 18 Months  Lab Units 05/23/18  0741 08/29/17  0714 06/13/17  1038   ALT (SGPT) U/L 20 15 34*   AST (SGOT) U/L 13 14 18   ALK PHOS U/L 68 50 54     THYROID:    Lab Results - Last 18 Months  Lab Units 05/23/18  0741 10/31/17  1402 08/29/17  0714 06/13/17  1038   TSH mIU/mL 7.790* 3.590 6.840* 4.780*     A1C:    Lab Results - Last 18 Months  Lab Units 05/23/18  0741 08/29/17  0714 06/13/17  1038   HEMOGLOBIN A1C % 7.50 6.6* 9.1*     PSA:No results for input(s): PSA in the last 06230 hours.    Objective   /90 (BP Location: Left arm, Patient Position: Sitting)   Pulse 112   Temp 98.7 °F (37.1 °C) (Oral)   Resp 18   Ht 172.7 cm (68\")   Wt (!) 140 kg (308 lb)   SpO2 98%   BMI 46.83 kg/m²   Body mass index is 46.83 kg/m².    Physical Exam  GENERAL:  Well nourished/developed in no acute distress. Obese   SKIN: Turgor excellent, without wound, rash, lesion.  HEENT: Normal cephalic without trauma.  Pupils equal round reactive to light. Extraocular motions full without nystagmus.   External canals nonobstructive nontender without reddness. Tymphatic membranes tomeka with bryson structures intact.   Oral cavity without growths, exudates, and moist.  Posterior pharynx without mass, obstruction, redness.  No thyromegaly, mass, tenderness, lymphadenopathy and supple.  CV: Regular rhythm.  No murmur, gallop,  edema. Posterior pulses intact.  No carotid bruits.  CHEST: No chest wall tenderness or mass.   LUNGS: Symmetric motion with clear to auscultation.  No dullness to percussion  ABD: Soft, nontender without mass. "   ORTHO: Symmetric extremities without swelling/point tenderness.  Full gross range of motion.  NEURO: CN 2-12 grossly intact.  Symmetric facies. 1/4 x bicep knee equal reflexes.  UE/LE   4/5 strength throughout.  Nonfocal use extremities. Speech clear.  Reduced light touch with monofilament, vibratory sensation with tuning fork; equal toes/distal feet.    PSYCH: Oriented x 3.  Pleasant calm, well kept.  Purposeful/directed conservation with intact short/long gross memory.     Assessment/Plan     1. Vitamin B12 deficiency-lowish with paresthesia    2. Controlled type 2 diabetes mellitus without complication, without long-term current use of insulin (CMS/[facility])    3. Hypothyroidism, unspecified type    4. Obesity-offered referral surgery        Rx: reviewed/changes:  same    LAB/Testing/Referrals: reviewed/orders:   Today:   No orders of the defined types were placed in this encounter.    Usual:   6m TSH, T4, A1c  12m CBC, CMP, A1c,  LIPID, TSH, T4, B12, folate    Discussions:     Body mass index is 46.83 kg/m².   Patient's Body mass index is 46.83 kg/m². BMI is above normal parameters. Recommendations include: exercise counseling, nutrition counseling and as before.  Non-smoker    Patient Instructions   Your A1c today is 7.5    A1c values:   <5.5 what we see on a normal/non-diabetic person  6.5 what it takes to be diagnosed with diabetes AND what most endocrinologist recommend if you are a diabetic  7.5 what the American diabetic association says you should be.          Follow up: Return for lab during/or just before next apt;, Dr Auguste-, 6 m;.  Future Appointments  Date Time Provider Department Center   12/28/2018 1:15 PM Theo Auguste MD [facility] None

## 2018-07-24 ENCOUNTER — TELEPHONE (OUTPATIENT)
Dept: FAMILY MEDICINE CLINIC | Facility: CLINIC | Age: 33
End: 2018-07-24

## 2018-07-24 DIAGNOSIS — IMO0001 UNCONTROLLED TYPE 2 DIABETES MELLITUS WITHOUT COMPLICATION, WITHOUT LONG-TERM CURRENT USE OF INSULIN: Primary | ICD-10-CM

## 2018-07-24 NOTE — TELEPHONE ENCOUNTER
Pt came in ad filled out nurse communication form and states that her BS today has bryant running really high at 8089 it was 435 @ 948 it was 391 then  About a hour ago she ate pizza rolls and her BS is now 417 and would evelia to know what to do [phone]

## 2018-07-24 NOTE — TELEPHONE ENCOUNTER
Current Outpatient Prescriptions:   •  glyBURIDE (DIAbeta) 5 MG tablet, TAKE ONE TABLET TWICE DAILY WITH FOOD, Disp: 60 tablet, Rfl: 5  •  levothyroxine (SYNTHROID, LEVOTHROID) 88 MCG tablet, Take 1 tablet by mouth Daily., Disp: 30 tablet, Rfl: 5  •  metFORMIN (GLUCOPHAGE) 1000 MG tablet, Take 1 tablet by mouth 2 (Two) Times a Day With Meals., Disp: 60 tablet, Rfl: 5  •  norgestimate-ethinyl estradiol (SPRINTEC 28) 0.25-35 MG-MCG per tablet, Take 1 tablet by mouth Daily., Disp: 3 package, Rfl: 3  •  TRADJENTA 5 MG tablet tablet, TAKE ONE TABLET DAILY, Disp: 30 tablet, Rfl: 5    Current Facility-Administered Medications:   •  cyanocobalamin injection 1,000 mcg, 1,000 mcg, Intramuscular, Q30 Days, Theo Auguste MD, 1,000 mcg at 07/11/18 1233     Diet needs a lot of work; pizza rolls not a good choice when BS up.   May need referral to dietary to review DM diet  Come here and get Rx for   Give her sample of humalog/novolog and have her give herself 10 units every 4 hours until her BS comes below 180 then stop;  Stay hydrated; 60 oz water/24 hr  Apt tomorrow afternoon

## 2018-07-24 NOTE — TELEPHONE ENCOUNTER
Advised pt to come in and get sample of Novolog flex pen with needle tip to take 10U until below 180 and stop per Dr Auguste, copy of directions for pt and will do insulin teaching

## 2018-07-25 ENCOUNTER — OFFICE VISIT (OUTPATIENT)
Dept: FAMILY MEDICINE CLINIC | Facility: CLINIC | Age: 33
End: 2018-07-25

## 2018-07-25 VITALS
TEMPERATURE: 98.9 F | OXYGEN SATURATION: 100 % | SYSTOLIC BLOOD PRESSURE: 124 MMHG | WEIGHT: 293 LBS | DIASTOLIC BLOOD PRESSURE: 70 MMHG | HEART RATE: 108 BPM | BODY MASS INDEX: 44.41 KG/M2 | HEIGHT: 68 IN | RESPIRATION RATE: 18 BRPM

## 2018-07-25 DIAGNOSIS — E11.65 TYPE 2 DIABETES MELLITUS WITH HYPERGLYCEMIA, WITHOUT LONG-TERM CURRENT USE OF INSULIN (HCC): ICD-10-CM

## 2018-07-25 DIAGNOSIS — R73.9 HYPERGLYCEMIA: ICD-10-CM

## 2018-07-25 DIAGNOSIS — R53.83 FATIGUE, UNSPECIFIED TYPE: ICD-10-CM

## 2018-07-25 DIAGNOSIS — E03.9 HYPOTHYROIDISM, UNSPECIFIED TYPE: Chronic | ICD-10-CM

## 2018-07-25 PROCEDURE — 99214 OFFICE O/P EST MOD 30 MIN: CPT | Performed by: FAMILY MEDICINE

## 2018-07-25 NOTE — PATIENT INSTRUCTIONS
Change insulin 10 units with meal and for bedtime    Sliding scale  Based on BS give this much insulin EXTRA to any other insulin/diabetic medication being used.     150-199 2 units  200-249 3 units  250-299 4 units  300-349 5 units  350-400 6 units  Greater 400 7 units

## 2018-07-25 NOTE — PROGRESS NOTES
Subjective   Reginald Hassan is a 32 y.o. female presenting with chief complaint of:   Chief Complaint   Patient presents with   • Diabetes       History of Present Illness :  Alone.  Here for primarily an acute issue today; call yesterday with BS 400s. Stopped as advised and quickly learned how to use humalog.  Has home report; BS dropped 400s to 160 range quickly; used 40 units/24 hr.   Has multiple chronic problems to consider that might have a bearing on today's issues; not an interval appointment.   No fever, infection, steroids.     Chronic/acute problems to review today:   1. Type 2 diabetes mellitus with hyperglycemia, without long-term current use of insulin (CMS/[facility]): chronic/variable and A1c ok last.    2. Hypothyroidism, unspecified type: chronic/variable with increase Rx 2m ago; needs repeat lab    3. Hyperglycemia: acute/with above   4. Fatigue, unspecified type: acute/with above.      Has an/another acute issue today: none.    The following portions of the patient's history were reviewed and updated as appropriate: allergies, current medications, past family history, past medical history, past social history, past surgical history and problem list.  Records acquired and reviewed; TCC migrated.      Current Outpatient Prescriptions:   •  glyBURIDE (DIAbeta) 5 MG tablet, TAKE ONE TABLET TWICE DAILY WITH FOOD, Disp: 60 tablet, Rfl: 5  •  insulin aspart (NOVOLOG FLEXPEN) 100 UNIT/ML solution pen-injector sc pen, 10 Units Q 4 hours until BS comes below 180 then stop, Disp: 1 pen, Rfl: 0  •  levothyroxine (SYNTHROID, LEVOTHROID) 88 MCG tablet, Take 1 tablet by mouth Daily., Disp: 30 tablet, Rfl: 5  •  metFORMIN (GLUCOPHAGE) 1000 MG tablet, Take 1 tablet by mouth 2 (Two) Times a Day With Meals., Disp: 60 tablet, Rfl: 5  •  norgestimate-ethinyl estradiol (SPRINTEC 28) 0.25-35 MG-MCG per tablet, Take 1 tablet by mouth Daily., Disp: 3 package, Rfl: 3  •  TRADJENTA 5 MG tablet tablet, TAKE ONE TABLET DAILY, Disp:  30 tablet, Rfl: 5    Current Facility-Administered Medications:   •  cyanocobalamin injection 1,000 mcg, 1,000 mcg, Intramuscular, Q30 Days, Theo Auguste MD, 1,000 mcg at 07/11/18 1233    No problems with medications.  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 Sleep is ok; apnea denied. No fever now.  ENDO:  No syncope, near or diaphoretic sweaty spells; just fatigue.   HEENT: No head injury  headache,  No vision change, No hearing loss.  Ears without pain/drainage.  No sore throat.  No nasal/sinus congestion/drainage. No epistaxis.  CHEST: No chest wall tenderness or mass. No cough, without wheeze.  No SOB; no hemoptysis.  CV: No chest pain, palpitations, ankle edema.  GI: No heartburn, dysphagia.  No abdominal pain, diarrhea, constipation.  No rectal bleeding, or melena.    :  Voids without dysuria, or incontinence to completion.  ORTHO: No painful/swollen joints but various on /off sore.  No sore neck or back.  No acute neck or back pain without recent injury.   NEURO: No dizziness, weakness of extremities.  No change occ numbness/paresthesias.   PSYCH: No memory loss.  Mood good; anxious, depressed but/and not suicidal.  Tries to tolerate stress .     Screening:  Mammogram: 2009  Bone density: NA  Low dose CT chest: NA  GI:   NA  Prostate: NA  Usual lab order  6m TSH, T4, A1c  12m CBC, CMP, A1c,  LIPID, TSH, T4, B12, folate    Results for orders placed or performed in visit on 05/23/18   Hemoglobin A1c   Result Value Ref Range    Hemoglobin A1C 7.50 %   MicroAlbumin, Urine, Random - Urine, Clean Catch   Result Value Ref Range    Microalbumin, Urine 93.9 Not Estab. ug/mL   Vitamin B12   Result Value Ref Range    Vitamin B-12 >1000 (H) 239 - 931 pg/mL   Folate   Result Value Ref Range    Folate 9.77 >2.75 ng/mL   Comprehensive metabolic panel   Result Value Ref Range     Glucose 97 70 - 100 mg/dL    BUN 6 5 - 21 mg/dL    Creatinine 0.56 0.50 - 1.40 mg/dL    eGFR Non African Am 125 >60 mL/min/1.73    eGFR African Am >150 >60 mL/min/1.73    BUN/Creatinine Ratio 10.7 7.0 - 25.0    Sodium 140 135 - 145 mmol/L    Potassium 4.4 3.5 - 5.3 mmol/L    Chloride 102 98 - 110 mmol/L    Total CO2 26.0 24.0 - 31.0 mmol/L    Calcium 9.2 8.4 - 10.4 mg/dL    Total Protein 7.0 6.3 - 8.7 g/dL    Albumin 3.70 3.50 - 5.00 g/dL    Globulin 3.3 gm/dL    A/G Ratio 1.1 1.1 - 2.5 g/dL    Total Bilirubin 0.3 0.1 - 1.0 mg/dL    Alkaline Phosphatase 68 24 - 120 U/L    AST (SGOT) 13 7 - 45 U/L    ALT (SGPT) 20 0 - 54 U/L   Lipid panel   Result Value Ref Range    Total Cholesterol 176 130 - 200 mg/dL    Triglycerides 124 0 - 149 mg/dL    HDL Cholesterol 49 (L) >=50 mg/dL    VLDL Cholesterol 24.8 mg/dL    LDL Cholesterol  102 (H) 0 - 99 mg/dL   T4   Result Value Ref Range    T4, Total 11.8 4.5 - 12.0 ug/dL   TSH   Result Value Ref Range    TSH 7.790 (H) 0.470 - 4.680 mIU/mL   CBC and Differential   Result Value Ref Range    WBC 7.34 4.80 - 10.80 10*3/mm3    RBC 4.57 4.20 - 5.40 10*6/mm3    Hemoglobin 11.5 (L) 12.0 - 16.0 g/dL    Hematocrit 36.6 (L) 37.0 - 47.0 %    MCV 80.1 (L) 82.0 - 98.0 fL    MCH 25.2 (L) 28.0 - 32.0 pg    MCHC 31.4 (L) 33.0 - 36.0 g/dL    RDW 15.0 12.0 - 15.0 %    Platelets 362 130 - 400 10*3/mm3    Neutrophil Rel % 54.3 39.0 - 78.0 %    Lymphocyte Rel % 36.0 15.0 - 45.0 %    Monocyte Rel % 5.3 4.0 - 12.0 %    Eosinophil Rel % 3.7 0.0 - 4.0 %    Basophil Rel % 0.3 0.0 - 2.0 %    Neutrophils Absolute 3.99 1.87 - 8.40 10*3/mm3    Lymphocytes Absolute 2.64 0.72 - 4.86 10*3/mm3    Monocytes Absolute 0.39 0.19 - 1.30 10*3/mm3    Eosinophils Absolute 0.27 0.00 - 0.70 10*3/mm3    Basophils Absolute 0.02 0.00 - 0.20 10*3/mm3    Immature Granulocyte Rel % 0.4 0.0 - 5.0 %    Immature Grans Absolute 0.03 0.00 - 0.03 10*3/mm3    nRBC 0.0 0.0 - 0.0 /100 WBC       No results found for: PSA     Lab  "Results:  CBC:    Lab Results - Last 18 Months  Lab Units 05/23/18  0741 06/13/17  1038   WBC x10E3/uL  --  6.6   HEMOGLOBIN g/dL 11.5* 12.2   HEMATOCRIT % 36.6* 37.0   PLATELETS 10*3/mm3 362 319      BMP/CMP:    Lab Results - Last 18 Months  Lab Units 05/23/18  0741 08/29/17  0714 06/13/17  1038   SODIUM mmol/L 140 141 137   POTASSIUM mmol/L 4.4 4.0 4.2   CHLORIDE mmol/L 102 99 98   TOTAL CO2, ARTERIAL mmol/L 26.0 22 22   GLUCOSE mg/dL 97 92 193*   BUN mg/dL 6 5* 6   CREATININE mg/dL 0.56 0.57 0.62   EGFR IF NONAFRICN AM mL/min/1.73 125 124 121   EGFR IF AFRICN AM mL/min/1.73 >150 143 139   CALCIUM mg/dL 9.2 9.4 9.0     HEPATIC:    Lab Results - Last 18 Months  Lab Units 05/23/18  0741 08/29/17  0714 06/13/17  1038   ALT (SGPT) U/L 20 15 34*   AST (SGOT) U/L 13 14 18   ALK PHOS U/L 68 50 54     THYROID:    Lab Results - Last 18 Months  Lab Units 05/23/18  0741 10/31/17  1402 08/29/17  0714 06/13/17  1038   TSH mIU/mL 7.790* 3.590 6.840* 4.780*     A1C:    Lab Results - Last 18 Months  Lab Units 05/23/18  0741 08/29/17  0714 06/13/17  1038   HEMOGLOBIN A1C % 7.50 6.6* 9.1*     PSA:No results for input(s): PSA in the last 88508 hours.    Objective   /70   Pulse 108   Temp 98.9 °F (37.2 °C) (Oral)   Resp 18   Ht 172.7 cm (68\")   Wt (!) 138 kg (304 lb)   LMP 06/29/2018 (Exact Date)   SpO2 100%   Breastfeeding? No   BMI 46.22 kg/m²   Body mass index is 46.22 kg/m².    Physical Exam  GENERAL:  Well nourished/developed in no acute distress. Obese   SKIN: Turgor excellent, without wound, rash, lesion.  HEENT: Normal cephalic without trauma.  Pupils equal round reactive to light. Extraocular motions full without nystagmus.   External canals nonobstructive nontender without reddness. Tymphatic membranes tomeka with bryson structures intact.   Oral cavity without growths, exudates, and moist.  Posterior pharynx without mass, obstruction, redness.  No thyromegaly, mass, tenderness, lymphadenopathy and supple.  CV: " Regular rhythm.  No murmur, gallop,  edema. Posterior pulses intact.  No carotid bruits.  CHEST: No chest wall tenderness or mass.   LUNGS: Symmetric motion with clear to auscultation.  No dullness to percussion  ABD: Soft, nontender without mass.   ORTHO: Symmetric extremities without swelling/point tenderness.  Full gross range of motion.  NEURO: CN 2-12 grossly intact.  Symmetric facies. 1/4 x bicep knee equal reflexes.  UE/LE   4/5 strength throughout.  Nonfocal use extremities. Speech clear.  Reduced light touch with monofilament, vibratory sensation with tuning fork; equal toes/distal feet.    PSYCH: Oriented x 3.  Pleasant calm, well kept.  Purposeful/directed conservation with intact short/long gross memory.     Assessment/Plan     1. Type 2 diabetes mellitus with hyperglycemia, without long-term current use of insulin (CMS/[facility])    2. Hypothyroidism, unspecified type    3. Hyperglycemia    4. Fatigue, unspecified type      Rx: reviewed/changes:  10 units meal and SS/bedtime    LAB/Testing/Referrals: reviewed/orders:   Today:   Orders Placed This Encounter   Procedures   • T4   • Comprehensive Metabolic Panel   • TSH   • T4, free   • Hemoglobin A1c   • Urinalysis With Culture If Indicated - Urine, Clean Catch   • CBC & Differential       Discussions:   May need insulin chronically  Body mass index is 46.22 kg/m².   Patient's Body mass index is 46.22 kg/m². BMI is above normal parameters. Recommendations include: exercise counseling and nutrition counseling.  Non-smoker      Patient Instructions   Change insulin 10 units with meal and for bedtime    Sliding scale  Based on BS give this much insulin EXTRA to any other insulin/diabetic medication being used.     150-199 2 units  200-249 3 units  250-299 4 units  300-349 5 units  350-400 6 units  Greater 400 7 units        Follow up: Return for lab today;, lab;, Dr Auguste-, as planned;.  Future Appointments  Date Time Provider Department Center   12/28/2018 1:15  PM Theo Auguste MD [facility] None

## 2018-07-27 DIAGNOSIS — IMO0001 UNCONTROLLED TYPE 2 DIABETES MELLITUS WITHOUT COMPLICATION, WITHOUT LONG-TERM CURRENT USE OF INSULIN: ICD-10-CM

## 2018-07-27 LAB
ALBUMIN SERPL-MCNC: 4.1 G/DL (ref 3.5–5)
ALBUMIN/GLOB SERPL: 1.2 G/DL (ref 1.1–2.5)
ALP SERPL-CCNC: 59 U/L (ref 24–120)
ALT SERPL-CCNC: 19 U/L (ref 0–54)
APPEARANCE UR: ABNORMAL
AST SERPL-CCNC: 15 U/L (ref 7–45)
BACTERIA #/AREA URNS HPF: ABNORMAL /HPF
BACTERIA UR CULT: NORMAL
BACTERIA UR CULT: NORMAL
BASOPHILS # BLD AUTO: 0.06 10*3/MM3 (ref 0–0.2)
BASOPHILS NFR BLD AUTO: 0.6 % (ref 0–2)
BILIRUB SERPL-MCNC: 0.6 MG/DL (ref 0.1–1)
BILIRUB UR QL STRIP: NEGATIVE
BUN SERPL-MCNC: 10 MG/DL (ref 5–21)
BUN/CREAT SERPL: 16.9 (ref 7–25)
CALCIUM SERPL-MCNC: 9.7 MG/DL (ref 8.4–10.4)
CASTS URNS MICRO: ABNORMAL
CASTS URNS QL MICRO: PRESENT /LPF
CHLORIDE SERPL-SCNC: 96 MMOL/L (ref 98–110)
CO2 SERPL-SCNC: 27 MMOL/L (ref 24–31)
COLOR UR: ABNORMAL
CREAT SERPL-MCNC: 0.59 MG/DL (ref 0.5–1.4)
EOSINOPHIL # BLD AUTO: 0.16 10*3/MM3 (ref 0–0.7)
EOSINOPHIL NFR BLD AUTO: 1.6 % (ref 0–4)
EPI CELLS #/AREA URNS HPF: >10 /HPF
ERYTHROCYTE [DISTWIDTH] IN BLOOD BY AUTOMATED COUNT: 14.3 % (ref 12–15)
GLOBULIN SER CALC-MCNC: 3.3 GM/DL
GLUCOSE SERPL-MCNC: 192 MG/DL (ref 70–100)
GLUCOSE UR QL: ABNORMAL
HBA1C MFR BLD: 9.5 %
HCT VFR BLD AUTO: 38.4 % (ref 37–47)
HGB BLD-MCNC: 12.1 G/DL (ref 12–16)
HGB UR QL STRIP: ABNORMAL
IMM GRANULOCYTES # BLD: 0.03 10*3/MM3 (ref 0–0.03)
IMM GRANULOCYTES NFR BLD: 0.3 % (ref 0–5)
KETONES UR QL STRIP: NEGATIVE
LEUKOCYTE ESTERASE UR QL STRIP: ABNORMAL
LYMPHOCYTES # BLD AUTO: 3.15 10*3/MM3 (ref 0.72–4.86)
LYMPHOCYTES NFR BLD AUTO: 31 % (ref 15–45)
MCH RBC QN AUTO: 25 PG (ref 28–32)
MCHC RBC AUTO-ENTMCNC: 31.5 G/DL (ref 33–36)
MCV RBC AUTO: 79.3 FL (ref 82–98)
MICRO URNS: ABNORMAL
MONOCYTES # BLD AUTO: 0.48 10*3/MM3 (ref 0.19–1.3)
MONOCYTES NFR BLD AUTO: 4.7 % (ref 4–12)
MUCOUS THREADS URNS QL MICRO: PRESENT /HPF
NEUTROPHILS # BLD AUTO: 6.28 10*3/MM3 (ref 1.87–8.4)
NEUTROPHILS NFR BLD AUTO: 61.8 % (ref 39–78)
NITRITE UR QL STRIP: NEGATIVE
NRBC BLD AUTO-RTO: 0 /100 WBC (ref 0–0)
PH UR STRIP: 5 [PH] (ref 5–7.5)
PLATELET # BLD AUTO: 414 10*3/MM3 (ref 130–400)
POTASSIUM SERPL-SCNC: 4.2 MMOL/L (ref 3.5–5.3)
PROT SERPL-MCNC: 7.4 G/DL (ref 6.3–8.7)
PROT UR QL STRIP: ABNORMAL
RBC # BLD AUTO: 4.84 10*6/MM3 (ref 4.2–5.4)
RBC #/AREA URNS HPF: ABNORMAL /HPF
SODIUM SERPL-SCNC: 137 MMOL/L (ref 135–145)
SP GR UR: 1.03 (ref 1–1.03)
T4 FREE SERPL-MCNC: 1.41 NG/DL (ref 0.78–2.19)
TSH SERPL DL<=0.005 MIU/L-ACNC: 7.05 MIU/ML (ref 0.47–4.68)
URINALYSIS REFLEX: ABNORMAL
UROBILINOGEN UR STRIP-MCNC: 0.2 MG/DL (ref 0.2–1)
WBC # BLD AUTO: 10.16 10*3/MM3 (ref 4.8–10.8)
WBC #/AREA URNS HPF: ABNORMAL /HPF

## 2018-08-25 DIAGNOSIS — IMO0001 UNCONTROLLED TYPE 2 DIABETES MELLITUS WITHOUT COMPLICATION, WITHOUT LONG-TERM CURRENT USE OF INSULIN: ICD-10-CM

## 2018-08-27 RX ORDER — LINAGLIPTIN 5 MG/1
TABLET, FILM COATED ORAL
Qty: 30 TABLET | Refills: 2 | Status: SHIPPED | OUTPATIENT
Start: 2018-08-27 | End: 2018-12-03 | Stop reason: SDUPTHER

## 2018-09-19 ENCOUNTER — OFFICE VISIT (OUTPATIENT)
Dept: OBSTETRICS AND GYNECOLOGY | Facility: CLINIC | Age: 33
End: 2018-09-19

## 2018-09-19 VITALS
DIASTOLIC BLOOD PRESSURE: 96 MMHG | WEIGHT: 293 LBS | HEIGHT: 68 IN | SYSTOLIC BLOOD PRESSURE: 152 MMHG | BODY MASS INDEX: 44.41 KG/M2

## 2018-09-19 DIAGNOSIS — N76.1 CHRONIC VAGINITIS: Primary | ICD-10-CM

## 2018-09-19 DIAGNOSIS — Z78.9 NON-SMOKER: ICD-10-CM

## 2018-09-19 PROCEDURE — 99213 OFFICE O/P EST LOW 20 MIN: CPT | Performed by: NURSE PRACTITIONER

## 2018-09-19 PROCEDURE — 87512 GARDNER VAG DNA QUANT: CPT | Performed by: NURSE PRACTITIONER

## 2018-09-19 PROCEDURE — 87481 CANDIDA DNA AMP PROBE: CPT | Performed by: NURSE PRACTITIONER

## 2018-09-19 PROCEDURE — 87491 CHLMYD TRACH DNA AMP PROBE: CPT | Performed by: NURSE PRACTITIONER

## 2018-09-19 PROCEDURE — 87798 DETECT AGENT NOS DNA AMP: CPT | Performed by: NURSE PRACTITIONER

## 2018-09-19 PROCEDURE — 87591 N.GONORRHOEAE DNA AMP PROB: CPT | Performed by: NURSE PRACTITIONER

## 2018-09-19 PROCEDURE — 87661 TRICHOMONAS VAGINALIS AMPLIF: CPT | Performed by: NURSE PRACTITIONER

## 2018-09-19 RX ORDER — FLUCONAZOLE 150 MG/1
150 TABLET ORAL EVERY OTHER DAY
Qty: 2 TABLET | Refills: 0 | Status: SHIPPED | OUTPATIENT
Start: 2018-09-19 | End: 2018-09-22

## 2018-09-19 RX ORDER — CLOTRIMAZOLE AND BETAMETHASONE DIPROPIONATE 10; .64 MG/G; MG/G
CREAM TOPICAL 2 TIMES DAILY
Qty: 15 G | Refills: 0 | Status: SHIPPED | OUTPATIENT
Start: 2018-09-19 | End: 2019-04-08

## 2018-09-19 NOTE — PROGRESS NOTES
"Gricelda Hassan is a 32 y.o. female.     Vaginitis   This is a new problem. The current episode started more than 1 month ago. The problem occurs constantly. The problem has been gradually worsening. Pertinent negatives include no abdominal pain, chest pain, fatigue, fever, headaches, nausea or vomiting. The symptoms are aggravated by intercourse. Treatments tried: OTC treatment. The treatment provided no relief.       The following portions of the patient's history were reviewed and updated as appropriate: allergies, current medications, past family history, past medical history, past social history, past surgical history and problem list.    /96 (BP Location: Left arm, Patient Position: Sitting)   Ht 172.7 cm (68\")   Wt (!) 140 kg (308 lb)   LMP 09/05/2018 (Approximate)   BMI 46.83 kg/m²     Review of Systems   Constitutional: Negative for activity change, appetite change, fatigue and fever.   Respiratory: Negative for apnea and shortness of breath.    Cardiovascular: Negative for chest pain and palpitations.   Gastrointestinal: Negative for abdominal distention, abdominal pain, constipation, diarrhea, nausea and vomiting.   Endocrine: Negative for cold intolerance and heat intolerance.   Genitourinary: Positive for vaginal discharge. Negative for difficulty urinating, frequency, menstrual problem, pelvic pain and vaginal pain.        Vaginal irritation, labial abrasion     Neurological: Negative for headaches.   Psychiatric/Behavioral: Negative for agitation and sleep disturbance.       Objective   Physical Exam   Constitutional: She is oriented to person, place, and time. She appears well-developed.   HENT:   Head: Normocephalic.   Neck: No tracheal deviation present.   Cardiovascular: Normal rate.    Pulmonary/Chest: Breath sounds normal. She has no wheezes.   Abdominal: Soft. There is no tenderness.   Genitourinary: Rectum normal. There is tenderness on the right labia. There is no rash on " the right labia. There is tenderness on the left labia. There is no rash on the left labia. Uterus is not deviated, not enlarged and not tender. Cervix exhibits no motion tenderness and no discharge. Right adnexum displays no mass, no tenderness and no fullness. Left adnexum displays no mass, no tenderness and no fullness. No erythema or tenderness in the vagina. No vaginal discharge found.   Genitourinary Comments: Labial irritation noted on left and broken skin   Lymphadenopathy:        Right: No inguinal adenopathy present.        Left: No inguinal adenopathy present.   Neurological: She is alert and oriented to person, place, and time. She has normal strength.   Skin: Skin is warm and dry. No rash noted. No cyanosis.   Psychiatric: She has a normal mood and affect. Her speech is normal and behavior is normal.       Assessment/Plan    Specimen collected for BV panel, gonorrhea and chlamydia testing.  Diflucan and Lotrisone sent to pharmacy. Further treatment if needed when results are available.     Patient's Body mass index is 46.83 kg/m². BMI is above normal parameters. Recommendations include: educational material.  RV based on results.   Reginald was seen today for vaginitis.    Diagnoses and all orders for this visit:    Chronic vaginitis  -     Gynecologic Fluid, Supplemental Testing    BMI 45.0-49.9, adult (CMS/[facility])    Non-smoker    Other orders  -     fluconazole (DIFLUCAN) 150 MG tablet; Take 1 tablet by mouth Every Other Day for 2 doses.  -     clotrimazole-betamethasone (LOTRISONE) 1-0.05 % cream; Apply  topically to the appropriate area as directed 2 (Two) Times a Day.

## 2018-09-24 NOTE — PATIENT INSTRUCTIONS

## 2018-09-26 LAB
GEN CATEG CVX/VAG CYTO-IMP: NORMAL
LAB AP CASE REPORT: NORMAL
Lab: NORMAL
PATH INTERP SPEC-IMP: NORMAL
STAT OF ADQ CVX/VAG CYTO-IMP: NORMAL

## 2018-12-03 DIAGNOSIS — IMO0001 UNCONTROLLED TYPE 2 DIABETES MELLITUS WITHOUT COMPLICATION, WITHOUT LONG-TERM CURRENT USE OF INSULIN: ICD-10-CM

## 2018-12-03 RX ORDER — LINAGLIPTIN 5 MG/1
TABLET, FILM COATED ORAL
Qty: 30 TABLET | Refills: 5 | Status: SHIPPED | OUTPATIENT
Start: 2018-12-03 | End: 2019-04-08

## 2018-12-07 ENCOUNTER — TELEPHONE (OUTPATIENT)
Dept: FAMILY MEDICINE CLINIC | Facility: CLINIC | Age: 33
End: 2018-12-07

## 2018-12-07 NOTE — TELEPHONE ENCOUNTER
rec notice of tier change for Tradjenta (non formulary)  and it will not be covered and suggest pt to try Januvia for a lower cost option?

## 2018-12-10 DIAGNOSIS — E11.8 TYPE 2 DIABETES MELLITUS WITH COMPLICATION, WITHOUT LONG-TERM CURRENT USE OF INSULIN (HCC): ICD-10-CM

## 2019-01-10 RX ORDER — LEVOTHYROXINE SODIUM 88 UG/1
TABLET ORAL
Qty: 30 TABLET | Refills: 0 | Status: SHIPPED | OUTPATIENT
Start: 2019-01-10

## 2019-04-08 ENCOUNTER — OFFICE VISIT (OUTPATIENT)
Dept: OBSTETRICS AND GYNECOLOGY | Facility: CLINIC | Age: 34
End: 2019-04-08

## 2019-04-08 VITALS
HEIGHT: 68 IN | BODY MASS INDEX: 44.41 KG/M2 | DIASTOLIC BLOOD PRESSURE: 86 MMHG | WEIGHT: 293 LBS | SYSTOLIC BLOOD PRESSURE: 142 MMHG

## 2019-04-08 DIAGNOSIS — Z01.419 WELL WOMAN EXAM WITH ROUTINE GYNECOLOGICAL EXAM: Primary | ICD-10-CM

## 2019-04-08 DIAGNOSIS — Z12.4 CERVICAL CANCER SCREENING: ICD-10-CM

## 2019-04-08 DIAGNOSIS — B37.31 YEAST VAGINITIS: ICD-10-CM

## 2019-04-08 DIAGNOSIS — N76.0 ACUTE VAGINITIS: ICD-10-CM

## 2019-04-08 PROCEDURE — 87798 DETECT AGENT NOS DNA AMP: CPT | Performed by: NURSE PRACTITIONER

## 2019-04-08 PROCEDURE — 87481 CANDIDA DNA AMP PROBE: CPT | Performed by: NURSE PRACTITIONER

## 2019-04-08 PROCEDURE — 87591 N.GONORRHOEAE DNA AMP PROB: CPT | Performed by: NURSE PRACTITIONER

## 2019-04-08 PROCEDURE — 99395 PREV VISIT EST AGE 18-39: CPT | Performed by: NURSE PRACTITIONER

## 2019-04-08 PROCEDURE — G0123 SCREEN CERV/VAG THIN LAYER: HCPCS | Performed by: NURSE PRACTITIONER

## 2019-04-08 PROCEDURE — 87661 TRICHOMONAS VAGINALIS AMPLIF: CPT | Performed by: NURSE PRACTITIONER

## 2019-04-08 PROCEDURE — 87512 GARDNER VAG DNA QUANT: CPT | Performed by: NURSE PRACTITIONER

## 2019-04-08 PROCEDURE — 87491 CHLMYD TRACH DNA AMP PROBE: CPT | Performed by: NURSE PRACTITIONER

## 2019-04-08 RX ORDER — INSULIN GLARGINE 100 [IU]/ML
30 INJECTION, SOLUTION SUBCUTANEOUS
Refills: 3 | COMMUNITY
Start: 2019-04-01 | End: 2022-06-20

## 2019-04-08 RX ORDER — SITAGLIPTIN 100 MG/1
100 TABLET, FILM COATED ORAL DAILY
Refills: 2 | COMMUNITY
Start: 2019-03-26 | End: 2022-06-20

## 2019-04-08 NOTE — PROGRESS NOTES
Subjective   Reginald Hassan is a 33 y.o. female  YOB: 1985        Chief Complaint   Patient presents with   • Gynecologic Exam     Patient here for yearly exam. Last exam was done on 04/04/18 and was normal.  Patient would like to discuss OCP. Patient is on Sprintec to regulate periods but states that her periods are still irregular. Patient was also told by someone here that the OCP could be afftecting her A1C. Patient would like to discuss other options.        Gynecologic Exam   The patient's primary symptoms include genital itching, a genital odor and vaginal discharge. The patient's pertinent negatives include no genital lesions, genital rash, missed menses, pelvic pain or vaginal bleeding. Pertinent negatives include no abdominal pain, back pain, constipation, diarrhea, dysuria, fever, frequency, hematuria, nausea, rash, sore throat, urgency or vomiting.       The following portions of the patient's history were reviewed and updated as appropriate: allergies, current medications, past family history, past medical history, past social history, past surgical history and problem list.    No Known Allergies    Past Medical History:   Diagnosis Date   • Abnormal Pap smear of cervix    • Cervical dysplasia    • Diabetes mellitus (CMS/HCC)    • Disease of thyroid gland    • Hypothyroidism        Family History   Problem Relation Age of Onset   • Diabetes Paternal Grandmother    • Hypertension Maternal Grandfather    • Coronary artery disease Maternal Grandfather    • Breast cancer Maternal Aunt    • Kidney disease Father    • Hypertension Father    • Ovarian cancer Neg Hx    • Uterine cancer Neg Hx    • Colon cancer Neg Hx    • Melanoma Neg Hx        Social History     Socioeconomic History   • Marital status: Single     Spouse name: Not on file   • Number of children: 1   • Years of education: 12   • Highest education level: Not on file   Occupational History   • Occupation: assistant mananger    Tobacco Use   • Smoking status: Never Smoker   • Smokeless tobacco: Never Used   Substance and Sexual Activity   • Alcohol use: No   • Drug use: No   • Sexual activity: Yes     Partners: Male     Birth control/protection: OCP         Current Outpatient Medications:   •  Insulin Glargine (BASAGLAR KWIKPEN) 100 UNIT/ML injection pen, 10 UNITS SQ BEDTIME, Disp: , Rfl: 3  •  Insulin Pen Needle 32G X 8 MM misc, Use with insulin 3 times daily, Disp: 100 each, Rfl: 5  •  JANUVIA 100 MG tablet, Take 100 mg by mouth Daily., Disp: , Rfl: 2  •  levothyroxine (SYNTHROID, LEVOTHROID) 88 MCG tablet, TAKE ONE TABLET DAILY GENERIC FOR SYNTHROID, Disp: 30 tablet, Rfl: 0  •  metFORMIN (GLUCOPHAGE) 1000 MG tablet, TAKE ONE TABLET TWICE DAILY WITH MEALS GENERIC FOR GLUCOPHAGE, Disp: 60 tablet, Rfl: 0  •  SPRINTEC 28 0.25-35 MG-MCG per tablet, TAKE 1 TABLET BY MOUTH EVERY DAY, Disp: 28 tablet, Rfl: 0  •  terconazole (TERAZOL 3) 0.8 % vaginal cream, Insert 1 applicator into the vagina Every Night., Disp: 20 g, Rfl: 0  No current facility-administered medications for this visit.     No LMP recorded.    Sexual History:         Could not be calculated    Past Surgical History:   Procedure Laterality Date   • CARPAL TUNNEL RELEASE     • CERVICAL BIOPSY  W/ LOOP ELECTRODE EXCISION     •  SECTION         Review of Systems   Constitutional: Negative for activity change, appetite change, fatigue, fever, unexpected weight gain and unexpected weight loss.   HENT: Negative for congestion, ear pain, hearing loss, nosebleeds, rhinorrhea, sore throat, tinnitus and trouble swallowing.    Eyes: Negative for blurred vision, pain, discharge, itching and visual disturbance.   Respiratory: Negative for apnea, chest tightness, shortness of breath and wheezing.    Cardiovascular: Negative for chest pain and leg swelling.   Gastrointestinal: Negative for abdominal pain, blood in stool, constipation, diarrhea, nausea, vomiting and GERD.   Endocrine:  Negative for heat intolerance, polydipsia and polyuria.   Genitourinary: Positive for vaginal discharge. Negative for urinary incontinence, decreased libido, difficulty urinating, dyspareunia, dysuria, frequency, genital sores, hematuria, menstrual problem, missed menses, pelvic pain, urgency, vaginal pain and breast lump.   Musculoskeletal: Negative for arthralgias, back pain, joint swelling and myalgias.   Skin: Negative for color change, rash and skin lesions.   Allergic/Immunologic: Negative for environmental allergies, food allergies and immunocompromised state.   Neurological: Negative for dizziness, tremors, seizures, syncope, facial asymmetry, numbness and headache.   Hematological: Negative for adenopathy. Does not bruise/bleed easily.   Psychiatric/Behavioral: Negative for agitation, hallucinations, sleep disturbance, suicidal ideas and depressed mood. The patient is not nervous/anxious.        Objective   Physical Exam   Constitutional: She is oriented to person, place, and time. She appears well-developed and well-nourished. No distress.   HENT:   Head: Normocephalic.   Right Ear: External ear normal.   Left Ear: External ear normal.   Nose: Nose normal.   Mouth/Throat: Oropharynx is clear and moist.   Eyes: Conjunctivae are normal. Right eye exhibits no discharge. Left eye exhibits no discharge. No scleral icterus.   Neck: Normal range of motion. Neck supple. Carotid bruit is not present. No tracheal deviation present. No thyromegaly present.   Cardiovascular: Normal rate, regular rhythm, normal heart sounds and intact distal pulses.   No murmur heard.  Pulmonary/Chest: Effort normal and breath sounds normal. No respiratory distress. She has no wheezes. Right breast exhibits no inverted nipple, no mass, no nipple discharge, no skin change and no tenderness. Left breast exhibits no inverted nipple, no mass, no nipple discharge, no skin change and no tenderness. Breasts are symmetrical. There is no breast  swelling.   Abdominal: Soft. She exhibits no distension and no mass. There is no tenderness. There is no guarding. No hernia. Hernia confirmed negative in the right inguinal area and confirmed negative in the left inguinal area.   Genitourinary: Rectum normal, vagina normal and uterus normal. Rectal exam shows no mass. No breast tenderness, discharge or bleeding. Pelvic exam was performed with patient supine. There is no rash, tenderness, lesion or injury on the right labia. There is no rash, tenderness, lesion or injury on the left labia. Uterus is not enlarged, not fixed and not tender. Cervix exhibits no motion tenderness, no discharge and no friability. Right adnexum displays no mass, no tenderness and no fullness. Left adnexum displays no mass, no tenderness and no fullness. No erythema, tenderness or bleeding in the vagina. No foreign body in the vagina. No signs of injury around the vagina. No vaginal discharge found.   Genitourinary Comments:   BSU normal  Urethral meatus  Normal  Perineum  Normal   Musculoskeletal: Normal range of motion. She exhibits no edema or tenderness.   Lymphadenopathy:        Head (right side): No submental, no submandibular, no tonsillar, no preauricular, no posterior auricular and no occipital adenopathy present.        Head (left side): No submental, no submandibular, no tonsillar, no preauricular, no posterior auricular and no occipital adenopathy present.     She has no cervical adenopathy.        Right cervical: No superficial cervical, no deep cervical and no posterior cervical adenopathy present.       Left cervical: No superficial cervical, no deep cervical and no posterior cervical adenopathy present.     She has no axillary adenopathy.        Right: No inguinal adenopathy present.        Left: No inguinal adenopathy present.   Neurological: She is alert and oriented to person, place, and time. Coordination normal.   Skin: Skin is warm and dry. No bruising and no rash  "noted. She is not diaphoretic. No erythema.   Psychiatric: She has a normal mood and affect. Her behavior is normal. Judgment and thought content normal.   Nursing note and vitals reviewed.        Vitals:    04/08/19 1325   BP: 142/86   Weight: 134 kg (296 lb)   Height: 172.7 cm (68\")       Reginald was seen today for gynecologic exam.    Diagnoses and all orders for this visit:    Well woman exam with routine gynecological exam  Comments:  Normal well woman exam.  Thin prep pap smear done.    Orders:  -     Liquid-based Pap Smear, Screening    Cervical cancer screening  Comments:  Thin prep pap smear done.   Orders:  -     Liquid-based Pap Smear, Screening    Acute vaginitis  Comments:  Patient reports she thinks she may have an infection.  BV panel done - will f/u pending results.   Orders:  -     terconazole (TERAZOL 3) 0.8 % vaginal cream; Insert 1 applicator into the vagina Every Night.    Yeast vaginitis  Comments:  Wet prep positive for yeast.  RX for Terazol 3 sent to pharmacy.  Will f/u pending  BV panel results.   Orders:  -     terconazole (TERAZOL 3) 0.8 % vaginal cream; Insert 1 applicator into the vagina Every Night.          Patient's Body mass index is 45.01 kg/m². BMI is above normal parameters. Recommendations include: exercise counseling and nutrition counseling.             Non-Smoker    MyChart Instructions Given       "

## 2019-04-11 DIAGNOSIS — Z30.41 ENCOUNTER FOR SURVEILLANCE OF CONTRACEPTIVE PILLS: Primary | ICD-10-CM

## 2019-04-11 LAB
C TRACH RRNA CVX QL NAA+PROBE: NOT DETECTED
GEN CATEG CVX/VAG CYTO-IMP: NORMAL
LAB AP CASE REPORT: NORMAL
LAB AP GYN ADDITIONAL INFORMATION: NORMAL
LAB AP GYN OTHER FINDINGS: NORMAL
N GONORRHOEA RRNA SPEC QL NAA+PROBE: NOT DETECTED
PATH INTERP SPEC-IMP: NORMAL
STAT OF ADQ CVX/VAG CYTO-IMP: NORMAL
TRICHOMONAS VAGINALIS PCR: NOT DETECTED

## 2019-04-11 RX ORDER — NORGESTIMATE AND ETHINYL ESTRADIOL 0.25-0.035
1 KIT ORAL DAILY
Qty: 90 TABLET | Refills: 3 | Status: SHIPPED | OUTPATIENT
Start: 2019-04-11 | End: 2019-05-02

## 2019-04-12 DIAGNOSIS — N76.0 GARDNERELLA VAGINALIS INFECTION: Primary | ICD-10-CM

## 2019-04-12 DIAGNOSIS — B96.89 GARDNERELLA VAGINALIS INFECTION: Primary | ICD-10-CM

## 2019-04-12 RX ORDER — METRONIDAZOLE 500 MG/1
500 TABLET ORAL 2 TIMES DAILY
Qty: 14 TABLET | Refills: 0 | Status: SHIPPED | OUTPATIENT
Start: 2019-04-12 | End: 2019-04-19

## 2019-04-12 NOTE — PROGRESS NOTES
Please let patient know that her infection panel was positive for gardnerella vaginalis.  Flagyl has been sent to her pharmacy.

## 2019-05-02 ENCOUNTER — PROCEDURE VISIT (OUTPATIENT)
Dept: OBSTETRICS AND GYNECOLOGY | Facility: CLINIC | Age: 34
End: 2019-05-02

## 2019-05-02 VITALS
WEIGHT: 293 LBS | BODY MASS INDEX: 44.41 KG/M2 | SYSTOLIC BLOOD PRESSURE: 138 MMHG | DIASTOLIC BLOOD PRESSURE: 84 MMHG | HEIGHT: 68 IN

## 2019-05-02 DIAGNOSIS — Z53.8 UNSUCCESSFUL INSERTION OF INTRAUTERINE DEVICE (IUD): Primary | ICD-10-CM

## 2019-05-02 LAB
B-HCG UR QL: NEGATIVE
INTERNAL NEGATIVE CONTROL: NEGATIVE
INTERNAL POSITIVE CONTROL: POSITIVE
Lab: NORMAL

## 2019-05-02 PROCEDURE — 81025 URINE PREGNANCY TEST: CPT | Performed by: NURSE PRACTITIONER

## 2019-05-02 PROCEDURE — 58300 INSERT INTRAUTERINE DEVICE: CPT | Performed by: NURSE PRACTITIONER

## 2019-05-02 NOTE — PROGRESS NOTES
IUD Insertion Procedure Note    Pre-operative Diagnosis: Contraceptive management     Post-operative Diagnosis: same    Indications: contraception    Procedure Details   Urine pregnancy test was done and result was negative.  The risks (including infection, bleeding, pain, and uterine perforation) and benefits of the procedure were explained to the patient and Written informed consent was obtained.      Cervix cleansed with Betadine. Uterus sounded to 5 cm. IUD insertion attempted unsuccessfully.     IUD Information:  Mirena.    Condition:  Stable    Complications:  None  Patient tolerated the procedure with difficulty.    Plan:    The patient was advised to call for any fever or for prolonged or severe pain or bleeding. She was advised to use Naproxen as needed for mild to moderate pain.

## 2019-05-14 ENCOUNTER — TELEPHONE (OUTPATIENT)
Dept: OBSTETRICS AND GYNECOLOGY | Facility: CLINIC | Age: 34
End: 2019-05-14

## 2019-05-14 NOTE — TELEPHONE ENCOUNTER
PT called and inquired about the upcoming appt with Dr Andujar and what is to be expected at the appt.  PT was advised.

## 2019-05-20 ENCOUNTER — OFFICE VISIT (OUTPATIENT)
Dept: OBSTETRICS AND GYNECOLOGY | Facility: CLINIC | Age: 34
End: 2019-05-20

## 2019-05-20 VITALS
SYSTOLIC BLOOD PRESSURE: 128 MMHG | DIASTOLIC BLOOD PRESSURE: 80 MMHG | HEIGHT: 68 IN | WEIGHT: 293 LBS | BODY MASS INDEX: 44.41 KG/M2

## 2019-05-20 DIAGNOSIS — N94.6 DYSMENORRHEA: ICD-10-CM

## 2019-05-20 DIAGNOSIS — N92.0 MENORRHAGIA WITH REGULAR CYCLE: Primary | ICD-10-CM

## 2019-05-20 DIAGNOSIS — Z78.9 NON-SMOKER: ICD-10-CM

## 2019-05-20 PROCEDURE — 99213 OFFICE O/P EST LOW 20 MIN: CPT | Performed by: OBSTETRICS & GYNECOLOGY

## 2019-05-20 RX ORDER — GLIPIZIDE 10 MG/1
10 TABLET ORAL 2 TIMES DAILY
Refills: 0 | COMMUNITY
Start: 2019-04-30 | End: 2019-09-09

## 2019-05-20 NOTE — PROGRESS NOTES
Subjective   Reginald Hassan is a 33 y.o. female is here today for follow-up.    Patient presents today to discuss problematic menses.    History of Present Illness     Patient has a long history of regular menses which are heavy and sometimes painful.  She passes large clots and has heavy flow for up to a week at a time.  Although she does have significant cramping, it is only associated with her heavy days.  She has no significant pain between her cycles and has no pain during light flow days.  Birth control pills in the past have not helped.  IUD insertion was attempted recently but was unable to be placed due to cervical stenosis.  She is status post LEEP conization and a  section.    The following portions of the patient's history were reviewed and updated as appropriate: allergies, current medications, past family history, past medical history, past social history, past surgical history and problem list.    Review of Systems   Genitourinary: Positive for menstrual problem, pelvic pain and vaginal bleeding.   All other systems reviewed and are negative.      Objective   Physical Exam   Constitutional: She is oriented to person, place, and time. She appears well-developed and well-nourished.   HENT:   Head: Normocephalic and atraumatic.   Neck: Normal range of motion. Neck supple.   Cardiovascular: Normal rate and regular rhythm.   Pulmonary/Chest: Effort normal and breath sounds normal.   Abdominal: Soft. Bowel sounds are normal.   Neurological: She is alert and oriented to person, place, and time.   Skin: Skin is warm and dry.   Psychiatric: She has a normal mood and affect. Her behavior is normal. Judgment and thought content normal.   Nursing note and vitals reviewed.        Assessment/Plan   Reginald was seen today for consult.    Diagnoses and all orders for this visit:    Menorrhagia with regular cycle  -     CBC Auto Differential  -     T3, Free  -     T3, Uptake  -     T4, Free  -      TSH    Dysmenorrhea    Non-smoker      Return office in 2 weeks for lab results and for ultrasound.  Pamphlets on laparoscopic sterilization, NovaSure endometrial ablation, and information on da Ministerio hysterectomy provided.  Call for concerns or questions.    Berto Andujar MD

## 2019-05-21 LAB
BASOPHILS # BLD AUTO: 0.04 10*3/MM3 (ref 0–0.2)
BASOPHILS NFR BLD AUTO: 0.4 % (ref 0–1.5)
EOSINOPHIL # BLD AUTO: 0.28 10*3/MM3 (ref 0–0.4)
EOSINOPHIL NFR BLD AUTO: 3 % (ref 0.3–6.2)
ERYTHROCYTE [DISTWIDTH] IN BLOOD BY AUTOMATED COUNT: 14.5 % (ref 12.3–15.4)
HCT VFR BLD AUTO: 38.9 % (ref 34–46.6)
HGB BLD-MCNC: 11.7 G/DL (ref 12–15.9)
IMM GRANULOCYTES # BLD AUTO: 0.03 10*3/MM3 (ref 0–0.05)
IMM GRANULOCYTES NFR BLD AUTO: 0.3 % (ref 0–0.5)
LYMPHOCYTES # BLD AUTO: 2.39 10*3/MM3 (ref 0.7–3.1)
LYMPHOCYTES NFR BLD AUTO: 25.2 % (ref 19.6–45.3)
MCH RBC QN AUTO: 25.8 PG (ref 26.6–33)
MCHC RBC AUTO-ENTMCNC: 30.1 G/DL (ref 31.5–35.7)
MCV RBC AUTO: 85.9 FL (ref 79–97)
MONOCYTES # BLD AUTO: 0.44 10*3/MM3 (ref 0.1–0.9)
MONOCYTES NFR BLD AUTO: 4.6 % (ref 5–12)
NEUTROPHILS # BLD AUTO: 6.31 10*3/MM3 (ref 1.7–7)
NEUTROPHILS NFR BLD AUTO: 66.5 % (ref 42.7–76)
NRBC BLD AUTO-RTO: 0 /100 WBC (ref 0–0.2)
PLATELET # BLD AUTO: 359 10*3/MM3 (ref 140–450)
RBC # BLD AUTO: 4.53 10*6/MM3 (ref 3.77–5.28)
T3FREE SERPL-MCNC: 2.7 PG/ML (ref 2–4.4)
T3RU NFR SERPL: 21 % (ref 24–39)
T4 FREE SERPL-MCNC: 1.58 NG/DL (ref 0.93–1.7)
TSH SERPL DL<=0.005 MIU/L-ACNC: 3.35 MIU/ML (ref 0.27–4.2)
WBC # BLD AUTO: 9.49 10*3/MM3 (ref 3.4–10.8)

## 2019-06-03 ENCOUNTER — OFFICE VISIT (OUTPATIENT)
Dept: OBSTETRICS AND GYNECOLOGY | Facility: CLINIC | Age: 34
End: 2019-06-03

## 2019-06-03 VITALS
SYSTOLIC BLOOD PRESSURE: 154 MMHG | HEIGHT: 68 IN | DIASTOLIC BLOOD PRESSURE: 80 MMHG | BODY MASS INDEX: 44.41 KG/M2 | WEIGHT: 293 LBS

## 2019-06-03 DIAGNOSIS — N92.0 MENORRHAGIA WITH REGULAR CYCLE: Primary | ICD-10-CM

## 2019-06-03 DIAGNOSIS — N94.6 DYSMENORRHEA: ICD-10-CM

## 2019-06-03 DIAGNOSIS — Z78.9 NON-SMOKER: ICD-10-CM

## 2019-06-03 PROCEDURE — 99213 OFFICE O/P EST LOW 20 MIN: CPT | Performed by: OBSTETRICS & GYNECOLOGY

## 2019-06-03 NOTE — PROGRESS NOTES
Subjective   Reginald Hassan is a 33 y.o. female is here today for follow-up.    Patient presents today to follow-up on menorrhagia and dysmenorrhea.    History of Present Illness     Labs and previous office notes are reviewed.  Labs are essentially normal.  Patient has opted for nonsurgical management at this time and does not want to pursue ultrasound today.    The following portions of the patient's history were reviewed and updated as appropriate: allergies, current medications, past family history, past medical history, past social history, past surgical history and problem list.    Review of Systems   Genitourinary: Positive for menstrual problem, pelvic pain and vaginal bleeding.   All other systems reviewed and are negative.      Objective   Physical Exam   Constitutional: She is oriented to person, place, and time. She appears well-developed and well-nourished.   HENT:   Head: Normocephalic and atraumatic.   Neurological: She is alert and oriented to person, place, and time.   Skin: Skin is warm and dry.   Psychiatric: She has a normal mood and affect. Her behavior is normal. Judgment and thought content normal.   Nursing note and vitals reviewed.        Assessment/Plan   Reginald was seen today for menorrhagia.    Diagnoses and all orders for this visit:    Menorrhagia with regular cycle    Dysmenorrhea    Non-smoker    Other orders  -     Norethin-Eth Estrad-Fe Biphas (LO LOESTRIN FE) 1 MG-10 MCG / 10 MCG tablet; Take 1 tablet by mouth Daily for 28 days.      Will start this pill when her current pack is run out.  Call for concerns or questions.  Return office in 3 months.    Berto Andujar MD

## 2019-07-30 ENCOUNTER — TELEPHONE (OUTPATIENT)
Dept: OBSTETRICS AND GYNECOLOGY | Facility: CLINIC | Age: 34
End: 2019-07-30

## 2019-07-30 DIAGNOSIS — N92.0 MENORRHAGIA WITH REGULAR CYCLE: Primary | ICD-10-CM

## 2019-08-09 DIAGNOSIS — N92.0 MENORRHAGIA WITH REGULAR CYCLE: ICD-10-CM

## 2019-08-12 DIAGNOSIS — N92.0 MENORRHAGIA WITH REGULAR CYCLE: ICD-10-CM

## 2019-08-21 DIAGNOSIS — N92.0 MENORRHAGIA WITH REGULAR CYCLE: ICD-10-CM

## 2019-09-09 ENCOUNTER — OFFICE VISIT (OUTPATIENT)
Dept: OBSTETRICS AND GYNECOLOGY | Facility: CLINIC | Age: 34
End: 2019-09-09

## 2019-09-09 VITALS
WEIGHT: 293 LBS | HEIGHT: 68 IN | BODY MASS INDEX: 44.41 KG/M2 | SYSTOLIC BLOOD PRESSURE: 124 MMHG | DIASTOLIC BLOOD PRESSURE: 76 MMHG

## 2019-09-09 DIAGNOSIS — N94.6 DYSMENORRHEA: ICD-10-CM

## 2019-09-09 DIAGNOSIS — N92.0 MENORRHAGIA WITH REGULAR CYCLE: Primary | ICD-10-CM

## 2019-09-09 DIAGNOSIS — Z30.41 ENCOUNTER FOR SURVEILLANCE OF CONTRACEPTIVE PILLS: ICD-10-CM

## 2019-09-09 PROCEDURE — 99213 OFFICE O/P EST LOW 20 MIN: CPT | Performed by: OBSTETRICS & GYNECOLOGY

## 2019-09-09 RX ORDER — INSULIN ASPART 100 [IU]/ML
INJECTION, SUSPENSION SUBCUTANEOUS
Refills: 2 | COMMUNITY
Start: 2019-08-06 | End: 2021-03-29

## 2019-09-09 NOTE — PROGRESS NOTES
Subjective   Reginald Hassan is a 33 y.o. female is here today for follow-up.    Patient presents today to follow-up on menorrhagia, dysmenorrhea and birth control pills.    History of Present Illness     She has had good results in the treatment of her symptoms and is happy with birth control pills as her form of contraception.  She is having no side effects.  She wishes to continue.    The following portions of the patient's history were reviewed and updated as appropriate: allergies, current medications, past family history, past medical history, past social history, past surgical history and problem list.    Review of Systems   All other systems reviewed and are negative.      Objective   Physical Exam   Constitutional: She is oriented to person, place, and time. She appears well-developed and well-nourished.   HENT:   Head: Normocephalic and atraumatic.   Abdominal: Soft. Bowel sounds are normal.   Neurological: She is alert and oriented to person, place, and time.   Skin: Skin is warm and dry.   Psychiatric: She has a normal mood and affect. Her behavior is normal. Judgment and thought content normal.   Nursing note and vitals reviewed.        Assessment/Plan   Reginald was seen today for menorrhagia.    Diagnoses and all orders for this visit:    Menorrhagia with regular cycle  -     Norethin-Eth Estrad-Fe Biphas (LO LOESTRIN FE) 1 MG-10 MCG / 10 MCG tablet; Take 1 tablet by mouth Daily.    Dysmenorrhea    Encounter for surveillance of contraceptive pills      Continue birth control pills.  Call for concerns or questions.  Return office in 1 year for yearly exam.    Berto Andujar MD

## 2020-06-23 ENCOUNTER — OFFICE VISIT (OUTPATIENT)
Dept: OBSTETRICS AND GYNECOLOGY | Facility: CLINIC | Age: 35
End: 2020-06-23

## 2020-06-23 VITALS
BODY MASS INDEX: 43.35 KG/M2 | HEIGHT: 68 IN | SYSTOLIC BLOOD PRESSURE: 138 MMHG | WEIGHT: 286 LBS | DIASTOLIC BLOOD PRESSURE: 80 MMHG

## 2020-06-23 DIAGNOSIS — Z12.4 ENCOUNTER FOR SCREENING FOR CERVICAL CANCER: Primary | ICD-10-CM

## 2020-06-23 PROCEDURE — G0123 SCREEN CERV/VAG THIN LAYER: HCPCS | Performed by: OBSTETRICS & GYNECOLOGY

## 2020-06-23 PROCEDURE — 99395 PREV VISIT EST AGE 18-39: CPT | Performed by: OBSTETRICS & GYNECOLOGY

## 2020-06-23 PROCEDURE — 87624 HPV HI-RISK TYP POOLED RSLT: CPT | Performed by: OBSTETRICS & GYNECOLOGY

## 2020-06-23 RX ORDER — VALSARTAN 80 MG/1
80 TABLET ORAL DAILY
COMMUNITY
Start: 2020-06-02

## 2020-06-23 RX ORDER — VALSARTAN 160 MG/1
TABLET ORAL
COMMUNITY
Start: 2020-04-07 | End: 2021-03-29

## 2020-06-23 RX ORDER — NORGESTIMATE AND ETHINYL ESTRADIOL 0.25-0.035
1 KIT ORAL DAILY
Qty: 28 TABLET | Refills: 3 | Status: SHIPPED | OUTPATIENT
Start: 2020-06-23 | End: 2020-08-19 | Stop reason: SDUPTHER

## 2020-06-23 RX ORDER — LIRAGLUTIDE 6 MG/ML
INJECTION SUBCUTANEOUS
COMMUNITY
Start: 2020-05-19

## 2020-06-23 RX ORDER — PEN NEEDLE, DIABETIC 32GX 5/32"
NEEDLE, DISPOSABLE MISCELLANEOUS
COMMUNITY
Start: 2020-05-29

## 2020-06-23 RX ORDER — BLOOD SUGAR DIAGNOSTIC
STRIP MISCELLANEOUS
COMMUNITY
Start: 2020-05-13

## 2020-06-23 NOTE — PROGRESS NOTES
Subjective   Reginald Hassan is a 34 y.o. female  YOB: 1985    Chief Complaint   Patient presents with   • Gynecologic Exam     Pt complains of heavy and long periods  PT states she started her cycle on  and bled till  started bleeding on 6/15 Pt states she is not in her last row of her pills.         34 year old female  5- reports that she is here for annual examination. She reports that for the past several cycles she has had prolonged bleeding. She reports that her last cycle she bled from May 15 to May 30 and that she started again on Silvana . She is currently on Lo Loestrin. She reports that she had previously tried to have a Mirena place but that it was unsuccessful due to cervical stenosis. She reports that she has a history of LEEP in  as well as a previous  section. She is not currently sexually active at this time. She denies any migraines with aura. Denies any history of blood clots or bleeding disorders. She does have a history of DM as well thyroid dysfunction.       No Known Allergies    Past Medical History:   Diagnosis Date   • Abnormal Pap smear of cervix    • Cervical dysplasia    • Diabetes mellitus (CMS/HCC)    • Disease of thyroid gland    • Hypothyroidism        Family History   Problem Relation Age of Onset   • Diabetes Paternal Grandmother    • Hypertension Maternal Grandfather    • Coronary artery disease Maternal Grandfather    • Breast cancer Maternal Aunt    • Kidney disease Father    • Hypertension Father    • Ovarian cancer Neg Hx    • Uterine cancer Neg Hx    • Colon cancer Neg Hx    • Melanoma Neg Hx        Social History     Socioeconomic History   • Marital status: Single     Spouse name: Not on file   • Number of children: 1   • Years of education: 12   • Highest education level: Not on file   Occupational History   • Occupation: assistant mananger   Tobacco Use   • Smoking status: Never Smoker   • Smokeless tobacco: Never Used    Substance and Sexual Activity   • Alcohol use: No   • Drug use: No   • Sexual activity: Yes     Partners: Male     Birth control/protection: OCP         Current Outpatient Medications:   •  BD PEN NEEDLE ROBBIE U/F 32G X 4 MM misc, USE TO INJECT INSULIN 3 TIMES DAILY AS NEEDED., Disp: , Rfl:   •  glucose blood test strip, USE TO TEST BLOOD SUGAR 3 TIMES A DAY AS NEEDED, Disp: , Rfl:   •  Insulin Glargine (BASAGLAR KWIKPEN) 100 UNIT/ML injection pen, 10 UNITS SQ BEDTIME, Disp: , Rfl: 3  •  JANUVIA 100 MG tablet, Take 100 mg by mouth Daily., Disp: , Rfl: 2  •  levothyroxine (SYNTHROID, LEVOTHROID) 88 MCG tablet, TAKE ONE TABLET DAILY GENERIC FOR SYNTHROID, Disp: 30 tablet, Rfl: 0  •  metFORMIN (GLUCOPHAGE) 1000 MG tablet, TAKE ONE TABLET TWICE DAILY WITH MEALS GENERIC FOR GLUCOPHAGE, Disp: 60 tablet, Rfl: 0  •  NOVOLOG MIX 70/30 FLEXPEN (70-30) 100 UNIT/ML suspension pen-injector injection, 10 UNITS UNDER SKIN 3 TIMES A DAY WITH MEALS, Disp: , Rfl: 2  •  ONETOUCH ULTRA test strip, USE TO TEST BLOOD SUGAR 3 TIMES A DAY AS NEEDED, Disp: , Rfl:   •  valsartan (DIOVAN) 160 MG tablet, TAKE 1 TABLET BY MOUTH EVERY DAY .STOP LOSARTAN, Disp: , Rfl:   •  valsartan (DIOVAN) 80 MG tablet, Take 80 mg by mouth Daily., Disp: , Rfl:   •  VICTOZA 18 MG/3ML solution pen-injector injection, INJECT 0.6MG SUBCUTANEOUSLY ONCE DAILY X 7 DAYS THEN 1.2MG DAILY, NOT TO EXCEED 1.8MG/DAY., Disp: , Rfl:   •  norgestimate-ethinyl estradiol (Sprintec 28) 0.25-35 MG-MCG per tablet, Take 1 tablet by mouth Daily., Disp: 28 tablet, Rfl: 3    Patient's last menstrual period was 06/15/2020 (exact date).    Sexual History:         Could not be calculated    Past Surgical History:   Procedure Laterality Date   • CARPAL TUNNEL RELEASE     • CERVICAL BIOPSY  W/ LOOP ELECTRODE EXCISION     •  SECTION         Review of Systems   Constitutional: Negative for activity change and unexpected weight loss.   HENT: Negative for congestion.    Cardiovascular:  Negative for chest pain.   Gastrointestinal: Negative for blood in stool, constipation and diarrhea.   Endocrine: Negative for cold intolerance and heat intolerance.   Genitourinary: Positive for menstrual problem and vaginal bleeding. Negative for dyspareunia, pelvic pain and vaginal discharge.   Musculoskeletal: Negative for arthralgias, back pain, neck pain and neck stiffness.   Skin: Negative for rash.   Neurological: Negative for dizziness and headache.   Psychiatric/Behavioral: Negative for sleep disturbance. The patient is not nervous/anxious.        Objective   Physical Exam   Constitutional: She is oriented to person, place, and time. She appears well-developed and well-nourished. No distress. She is morbidly obese.  HENT:   Head: Normocephalic and atraumatic.   Eyes: EOM are normal.   Neck: Normal range of motion. Neck supple.   Cardiovascular: Normal rate and regular rhythm.   No murmur heard.  Pulmonary/Chest: Effort normal and breath sounds normal. Right breast exhibits no inverted nipple and no mass. Left breast exhibits no inverted nipple and no mass. No breast tenderness or discharge.   Abdominal: Soft. She exhibits no distension. There is no tenderness.   Genitourinary: Vagina normal and uterus normal. Pelvic exam was performed with patient supine. There is no tenderness or lesion on the right labia. There is no tenderness or lesion on the left labia. Cervix does not exhibit motion tenderness, discharge or friability. Right adnexum displays no tenderness and no fullness. Left adnexum displays no tenderness and no fullness. No tenderness or bleeding in the vagina. No vaginal discharge found.   Genitourinary Comments: Difficult to assess due to body habitus.    Musculoskeletal: Normal range of motion. She exhibits no edema.   Neurological: She is alert and oriented to person, place, and time.   Skin: Skin is warm and dry.   Psychiatric: She has a normal mood and affect. Her behavior is normal.  "Judgment normal.   Nursing note and vitals reviewed.        Vitals:    06/23/20 1451   BP: 138/80   Weight: 130 kg (286 lb)   Height: 172.7 cm (68\")       Reginald was seen today for gynecologic exam.    Diagnoses and all orders for this visit:    Encounter for screening for cervical cancer   -     Liquid-based Pap Smear, Screening    Other orders  -     norgestimate-ethinyl estradiol (Sprintec 28) 0.25-35 MG-MCG per tablet; Take 1 tablet by mouth Daily.    -PAP smear collected today with results to follow   -Discussed with patient switching her to a different OCP at this time.   -RTC in 3 months for follow up with [facility] or sooner if symptoms fail to improve.     Quynh Adan, DO       "

## 2020-06-30 ENCOUNTER — TELEPHONE (OUTPATIENT)
Dept: OBSTETRICS AND GYNECOLOGY | Facility: CLINIC | Age: 35
End: 2020-06-30

## 2020-06-30 LAB
GEN CATEG CVX/VAG CYTO-IMP: NORMAL
HPV I/H RISK 4 DNA CVX QL PROBE+SIG AMP: NOT DETECTED
LAB AP CASE REPORT: NORMAL
LAB AP GYN ADDITIONAL INFORMATION: NORMAL
LAB AP GYN OTHER FINDINGS: NORMAL
PATH INTERP SPEC-IMP: NORMAL
STAT OF ADQ CVX/VAG CYTO-IMP: NORMAL

## 2020-06-30 RX ORDER — METRONIDAZOLE 500 MG/1
2000 TABLET ORAL ONCE
Qty: 4 TABLET | Refills: 0 | Status: SHIPPED | OUTPATIENT
Start: 2020-06-30 | End: 2020-06-30

## 2020-06-30 NOTE — TELEPHONE ENCOUNTER
----- Message from Quynh Adan DO sent at 6/30/2020  8:54 AM CDT -----  Please let the patient know that her pap smear was negative as well as negative HPV. Please let her know that it showed trichomonas. She will need to be treated. Please send her flagyl 2 grams and tell her to avoid alcohol. Her partner will also need to be treated. And she will need to return for a test of cure. Please see if she would like STI testing at this time.

## 2020-07-21 ENCOUNTER — OFFICE VISIT (OUTPATIENT)
Dept: OBSTETRICS AND GYNECOLOGY | Facility: CLINIC | Age: 35
End: 2020-07-21

## 2020-07-21 VITALS
DIASTOLIC BLOOD PRESSURE: 76 MMHG | BODY MASS INDEX: 43.04 KG/M2 | HEIGHT: 68 IN | WEIGHT: 284 LBS | SYSTOLIC BLOOD PRESSURE: 134 MMHG

## 2020-07-21 DIAGNOSIS — A59.01 TRICHOMONAL VAGINITIS: Primary | ICD-10-CM

## 2020-07-21 PROCEDURE — 87591 N.GONORRHOEAE DNA AMP PROB: CPT | Performed by: OBSTETRICS & GYNECOLOGY

## 2020-07-21 PROCEDURE — 99213 OFFICE O/P EST LOW 20 MIN: CPT | Performed by: OBSTETRICS & GYNECOLOGY

## 2020-07-21 PROCEDURE — 87661 TRICHOMONAS VAGINALIS AMPLIF: CPT | Performed by: OBSTETRICS & GYNECOLOGY

## 2020-07-21 PROCEDURE — 87491 CHLMYD TRACH DNA AMP PROBE: CPT | Performed by: OBSTETRICS & GYNECOLOGY

## 2020-07-21 NOTE — PROGRESS NOTES
"Subjective   Reginald Hassan is a 34 y.o. female.     Chief Complaint   Patient presents with   • Exposure to STD     Pt is here for STD testing follow up PT test positive for trich PT does want to have additional std testing        34-year-old female  1 para 1 Patient's last menstrual period was 2020 (exact date) notes for follow-up examination with long diagnosis of trichomonas.  Patient reports that she is previously compete completed her Flagyl.  She does report she still has some itching down there.  She does however report that she has not been sexually active in the past several years.  She has a longstanding history of diabetes and as per patient her last hemoglobin A1c was 9.  She also reports that so far she is doing well on her new oral contraceptive.       Review of Systems   Constitutional: Negative for chills and fever.   Genitourinary: Positive for vaginal bleeding and vaginal discharge.        Vaginal itching        Objective   /76   Ht 172.7 cm (68\")   Wt 129 kg (284 lb)   LMP 2020 (Exact Date)   BMI 43.18 kg/m²   Patient's last menstrual period was 2020 (exact date).  Physical Exam   Constitutional: She appears well-developed and well-nourished. No distress.   HENT:   Head: Normocephalic and atraumatic.   Pulmonary/Chest: Effort normal.   Abdominal: Soft. There is no tenderness.   Genitourinary: Vagina normal and cervix normal.       Pelvic exam was performed with patient supine. There is rash on the right labia. There is no tenderness or lesion on the right labia. There is rash on the left labia. There is no tenderness or lesion on the left labia. Uterus is not enlarged or tender. Cervix does not exhibit motion tenderness, discharge or friability. Right adnexum displays no tenderness and no fullness. Left adnexum displays no tenderness and no fullness. No tenderness or bleeding in the vagina. No signs of injury around the vagina. No vaginal discharge found. "   Nursing note and vitals reviewed.    Assessment/Plan   Problems Addressed this Visit     None      Visit Diagnoses     Trichomonal vaginitis    -  Primary    Relevant Orders    Gynecologic Fluid, Supplemental Testing      -Cultures sent with results to follow   -Discussed with patient applying mycolog to inguinal crease at this time.   -RTC for recheck or sooner if symptoms worsen.        Quynh Adan, DO

## 2020-07-24 ENCOUNTER — TELEPHONE (OUTPATIENT)
Dept: OBSTETRICS AND GYNECOLOGY | Facility: CLINIC | Age: 35
End: 2020-07-24

## 2020-07-24 LAB
C TRACH RRNA CVX QL NAA+PROBE: NOT DETECTED
LAB AP CASE REPORT: NORMAL
Lab: NORMAL
N GONORRHOEA RRNA SPEC QL NAA+PROBE: NOT DETECTED
PATH INTERP SPEC-IMP: NORMAL
STAT OF ADQ CVX/VAG CYTO-IMP: NORMAL
TRICHOMONAS VAGINALIS PCR: DETECTED

## 2020-07-24 RX ORDER — NYSTATIN 100000 U/G
CREAM TOPICAL 2 TIMES DAILY PRN
Qty: 15 G | Refills: 0 | Status: SHIPPED | OUTPATIENT
Start: 2020-07-24

## 2020-07-24 RX ORDER — METRONIDAZOLE 500 MG/1
2000 TABLET ORAL ONCE
Qty: 4 TABLET | Refills: 0 | Status: SHIPPED | OUTPATIENT
Start: 2020-07-24 | End: 2020-07-24

## 2020-07-24 RX ORDER — TRIAMCINOLONE ACETONIDE 5 MG/G
CREAM TOPICAL 2 TIMES DAILY
Qty: 15 G | Refills: 0 | Status: SHIPPED | OUTPATIENT
Start: 2020-07-24 | End: 2021-08-11

## 2020-07-24 NOTE — TELEPHONE ENCOUNTER
----- Message from Quynh Adan DO sent at 7/24/2020  2:31 PM CDT -----  Please let the patient know that it still shows she has trichomonas. Please send in flagyl 2 grams and have her come back in for cultures. Also her partner needs to be treated.

## 2020-08-19 ENCOUNTER — OFFICE VISIT (OUTPATIENT)
Dept: OBSTETRICS AND GYNECOLOGY | Facility: CLINIC | Age: 35
End: 2020-08-19

## 2020-08-19 VITALS
SYSTOLIC BLOOD PRESSURE: 130 MMHG | DIASTOLIC BLOOD PRESSURE: 70 MMHG | BODY MASS INDEX: 43.5 KG/M2 | HEIGHT: 68 IN | WEIGHT: 287 LBS

## 2020-08-19 DIAGNOSIS — A59.01 TRICHOMONAL VAGINITIS: Primary | ICD-10-CM

## 2020-08-19 PROCEDURE — 99213 OFFICE O/P EST LOW 20 MIN: CPT | Performed by: OBSTETRICS & GYNECOLOGY

## 2020-08-19 PROCEDURE — 87491 CHLMYD TRACH DNA AMP PROBE: CPT | Performed by: OBSTETRICS & GYNECOLOGY

## 2020-08-19 PROCEDURE — 87481 CANDIDA DNA AMP PROBE: CPT | Performed by: OBSTETRICS & GYNECOLOGY

## 2020-08-19 PROCEDURE — 87591 N.GONORRHOEAE DNA AMP PROB: CPT | Performed by: OBSTETRICS & GYNECOLOGY

## 2020-08-19 PROCEDURE — 87661 TRICHOMONAS VAGINALIS AMPLIF: CPT | Performed by: OBSTETRICS & GYNECOLOGY

## 2020-08-19 PROCEDURE — 87563 M. GENITALIUM AMP PROBE: CPT | Performed by: OBSTETRICS & GYNECOLOGY

## 2020-08-19 PROCEDURE — 87512 GARDNER VAG DNA QUANT: CPT | Performed by: OBSTETRICS & GYNECOLOGY

## 2020-08-19 PROCEDURE — 87798 DETECT AGENT NOS DNA AMP: CPT | Performed by: OBSTETRICS & GYNECOLOGY

## 2020-08-19 RX ORDER — NORGESTIMATE AND ETHINYL ESTRADIOL 0.25-0.035
1 KIT ORAL DAILY
Qty: 28 TABLET | Refills: 3 | Status: SHIPPED | OUTPATIENT
Start: 2020-08-19 | End: 2021-02-03

## 2020-08-19 NOTE — PROGRESS NOTES
"Subjective   Reginald Hassan is a 34 y.o. female.     Chief Complaint   Patient presents with   • Exposure to STD     PT is here for BIMAL     34-year-old female para 1 presents for follow-up examination from previous treatment for trichomonas.  Patient has previously been treated twice and is presented back for her test of cure.  She also was subsequently switched to Sprintec at this time.  She reports her first cycle is much more manageable but that this last cycle she has had some irregular bleeding.     Answers for HPI/ROS submitted by the patient on 8/12/2020   What is the primary reason for your visit?: Other  Please describe your symptoms.: Dont have any. This is just a follow up  Have you had these symptoms before?: No  How long have you been having these symptoms?: 1-4 days  Please list any medications you are currently taking for this condition.: none  Please describe any probable cause for these symptoms. : n/a    Review of Systems   Constitutional: Negative for chills and fever.   Genitourinary: Positive for menstrual problem and vaginal discharge.     Objective   /70   Ht 172.7 cm (68\")   Wt 130 kg (287 lb)   LMP 08/17/2020 (Exact Date)   BMI 43.64 kg/m²   Patient's last menstrual period was 08/17/2020 (exact date).  Physical Exam   Constitutional: She appears well-developed and well-nourished. No distress.   HENT:   Head: Normocephalic and atraumatic.   Pulmonary/Chest: Effort normal.   Abdominal: Soft. There is no tenderness.   Genitourinary: Vagina normal and cervix normal. Pelvic exam was performed with patient supine. There is no tenderness or lesion on the right labia. There is no tenderness or lesion on the left labia. Cervix does not exhibit discharge or friability. No tenderness or bleeding in the vagina. No signs of injury around the vagina. No vaginal discharge found.   Nursing note and vitals reviewed.    Assessment/Plan   Problems Addressed this Visit     None      Visit Diagnoses     " Trichomonal vaginitis    -  Primary    Relevant Orders    Gynecologic Fluid, Supplemental Testing      -Culture sent with results to follow.  -Will refill patient's contraceptive management at this time.  -Return to clinic in 3 months for follow-up examination or sooner symptoms worsen.       Quynh Adan, DO

## 2020-08-25 LAB
C TRACH RRNA CVX QL NAA+PROBE: NOT DETECTED
LAB AP CASE REPORT: NORMAL
Lab: NORMAL
N GONORRHOEA RRNA SPEC QL NAA+PROBE: NOT DETECTED
TRICHOMONAS VAGINALIS PCR: DETECTED

## 2021-03-29 ENCOUNTER — OFFICE VISIT (OUTPATIENT)
Dept: OBSTETRICS AND GYNECOLOGY | Facility: CLINIC | Age: 36
End: 2021-03-29

## 2021-03-29 VITALS
HEIGHT: 68 IN | SYSTOLIC BLOOD PRESSURE: 136 MMHG | WEIGHT: 287 LBS | BODY MASS INDEX: 43.5 KG/M2 | DIASTOLIC BLOOD PRESSURE: 84 MMHG

## 2021-03-29 DIAGNOSIS — E66.01 OBESITY, CLASS III, BMI 40-49.9 (MORBID OBESITY) (HCC): ICD-10-CM

## 2021-03-29 DIAGNOSIS — E66.9 DIABETES MELLITUS TYPE 2 IN OBESE (HCC): ICD-10-CM

## 2021-03-29 DIAGNOSIS — Z78.9 NON-SMOKER: ICD-10-CM

## 2021-03-29 DIAGNOSIS — N76.4 LEFT GENITAL LABIAL ABSCESS: Primary | ICD-10-CM

## 2021-03-29 DIAGNOSIS — E11.69 DIABETES MELLITUS TYPE 2 IN OBESE (HCC): ICD-10-CM

## 2021-03-29 PROCEDURE — 99213 OFFICE O/P EST LOW 20 MIN: CPT | Performed by: NURSE PRACTITIONER

## 2021-03-29 RX ORDER — SULFAMETHOXAZOLE AND TRIMETHOPRIM 800; 160 MG/1; MG/1
1 TABLET ORAL 2 TIMES DAILY
Qty: 20 TABLET | Refills: 0 | Status: SHIPPED | OUTPATIENT
Start: 2021-03-29 | End: 2021-08-11

## 2021-03-29 RX ORDER — INSULIN LISPRO 100 [IU]/ML
10 INJECTION, SOLUTION INTRAVENOUS; SUBCUTANEOUS NIGHTLY
COMMUNITY
Start: 2021-03-24

## 2021-08-11 ENCOUNTER — OFFICE VISIT (OUTPATIENT)
Dept: OBSTETRICS AND GYNECOLOGY | Facility: CLINIC | Age: 36
End: 2021-08-11

## 2021-08-11 VITALS
SYSTOLIC BLOOD PRESSURE: 138 MMHG | HEIGHT: 68 IN | BODY MASS INDEX: 40.92 KG/M2 | DIASTOLIC BLOOD PRESSURE: 82 MMHG | WEIGHT: 270 LBS

## 2021-08-11 DIAGNOSIS — E66.01 OBESITY, CLASS III, BMI 40-49.9 (MORBID OBESITY) (HCC): ICD-10-CM

## 2021-08-11 DIAGNOSIS — Z78.9 NON-SMOKER: ICD-10-CM

## 2021-08-11 DIAGNOSIS — A59.01 TRICHOMONAL VAGINITIS: ICD-10-CM

## 2021-08-11 DIAGNOSIS — Z12.31 ENCOUNTER FOR SCREENING MAMMOGRAM FOR MALIGNANT NEOPLASM OF BREAST: ICD-10-CM

## 2021-08-11 DIAGNOSIS — Z01.419 ENCOUNTER FOR GYNECOLOGICAL EXAMINATION WITHOUT ABNORMAL FINDING: Primary | ICD-10-CM

## 2021-08-11 PROCEDURE — 87624 HPV HI-RISK TYP POOLED RSLT: CPT | Performed by: NURSE PRACTITIONER

## 2021-08-11 PROCEDURE — 87661 TRICHOMONAS VAGINALIS AMPLIF: CPT | Performed by: NURSE PRACTITIONER

## 2021-08-11 PROCEDURE — G0123 SCREEN CERV/VAG THIN LAYER: HCPCS | Performed by: NURSE PRACTITIONER

## 2021-08-11 PROCEDURE — 99395 PREV VISIT EST AGE 18-39: CPT | Performed by: NURSE PRACTITIONER

## 2021-08-11 RX ORDER — KETOROLAC TROMETHAMINE 10 MG/1
10 TABLET, FILM COATED ORAL EVERY 6 HOURS PRN
COMMUNITY
Start: 2021-07-30 | End: 2022-06-20

## 2021-08-11 RX ORDER — CITALOPRAM 10 MG/1
10 TABLET ORAL DAILY
COMMUNITY
Start: 2021-06-29

## 2021-08-11 NOTE — PROGRESS NOTES
"Gricelda Hassan is a 35 y.o. female.     Annual exam.       The following portions of the patient's history were reviewed and updated as appropriate: allergies, current medications, past family history, past medical history, past social history, past surgical history and problem list.    /82   Ht 172.7 cm (68\")   Wt 122 kg (270 lb)   LMP 07/28/2021   BMI 41.05 kg/m²     Review of Systems   Constitutional: Negative for activity change, appetite change, fatigue and fever.   HENT: Negative for congestion, sore throat and trouble swallowing.    Eyes: Negative for pain, discharge and visual disturbance.   Respiratory: Negative for apnea, shortness of breath and wheezing.    Cardiovascular: Negative for chest pain, palpitations and leg swelling.   Gastrointestinal: Negative for abdominal pain, constipation and diarrhea.   Genitourinary: Positive for menstrual problem (3-7 days). Negative for frequency, pelvic pain, urgency and vaginal discharge.   Musculoskeletal: Negative for back pain and gait problem.   Skin: Negative for color change and rash.   Neurological: Negative for dizziness, weakness and numbness.   Psychiatric/Behavioral: Negative for confusion and sleep disturbance.       Objective   Physical Exam  Vitals and nursing note reviewed. Exam conducted with a chaperone present.   Constitutional:       General: She is not in acute distress.     Appearance: She is well-developed. She is not diaphoretic.   HENT:      Head: Normocephalic.      Right Ear: External ear normal.      Left Ear: External ear normal.      Nose: Nose normal.   Eyes:      General: No scleral icterus.        Right eye: No discharge.         Left eye: No discharge.      Conjunctiva/sclera: Conjunctivae normal.      Pupils: Pupils are equal, round, and reactive to light.   Neck:      Thyroid: No thyromegaly.      Vascular: No carotid bruit.      Trachea: No tracheal deviation.   Cardiovascular:      Rate and Rhythm: Normal " rate and regular rhythm.      Heart sounds: Normal heart sounds. No murmur heard.     Pulmonary:      Effort: Pulmonary effort is normal. No respiratory distress.      Breath sounds: Normal breath sounds. No wheezing.   Chest:      Breasts: Breasts are symmetrical.         Right: Normal. No swelling, bleeding, inverted nipple, mass, nipple discharge, skin change or tenderness.         Left: Normal. No swelling, bleeding, inverted nipple, mass, nipple discharge, skin change or tenderness.   Abdominal:      General: There is no distension.      Palpations: Abdomen is soft. There is no mass.      Tenderness: There is no abdominal tenderness. There is no right CVA tenderness, left CVA tenderness or guarding.      Hernia: No hernia is present. There is no hernia in the left inguinal area or right inguinal area.   Genitourinary:     General: Normal vulva.      Exam position: Lithotomy position.      Labia:         Right: No rash, tenderness, lesion or injury.         Left: No rash, tenderness, lesion or injury.       Vagina: Normal. No signs of injury and foreign body. No vaginal discharge, erythema, tenderness or bleeding.      Cervix: Normal.      Uterus: Normal. Not enlarged, not fixed and not tender.       Adnexa: Right adnexa normal and left adnexa normal.        Right: No mass, tenderness or fullness.          Left: No mass, tenderness or fullness.        Rectum: Normal. No mass.      Comments:   BSU normal  Urethral meatus  Normal  Perineum  Normal  Musculoskeletal:         General: No tenderness. Normal range of motion.      Cervical back: Normal range of motion and neck supple.   Lymphadenopathy:      Head:      Right side of head: No submental, submandibular, tonsillar, preauricular, posterior auricular or occipital adenopathy.      Left side of head: No submental, submandibular, tonsillar, preauricular, posterior auricular or occipital adenopathy.      Cervical: No cervical adenopathy.      Right cervical: No  superficial, deep or posterior cervical adenopathy.     Left cervical: No superficial, deep or posterior cervical adenopathy.      Upper Body:      Right upper body: No supraclavicular, axillary or pectoral adenopathy.      Left upper body: No supraclavicular, axillary or pectoral adenopathy.      Lower Body: No right inguinal adenopathy. No left inguinal adenopathy.   Skin:     General: Skin is warm and dry.      Findings: No bruising, erythema or rash.   Neurological:      Mental Status: She is alert and oriented to person, place, and time.      Coordination: Coordination normal.   Psychiatric:         Mood and Affect: Mood normal.         Behavior: Behavior normal.         Thought Content: Thought content normal.         Judgment: Judgment normal.         Assessment/Plan   Well woman GYN exam.   Pap smear done per ASCCP guidelines.   Will have lab work at PCP.     Encouraged SBE, pt is aware how to do self breast exam and the importance of same.   Discussed weight management and importance of maintaining a healthy weight.   Discussed Vitamin D intake and the importance of adequate vitamin D for both Bone Health and a healthy immune system.    Discussed Daily exercise and the importance of same, in regards to a healthy heart as well as helping to maintain her weight and improving her mental health.     Discussed STD prevention and testing.   Will repeat trichomonas testing r/t hx.     Mammogram will be scheduled at [facility].     Patient's Body mass index is 41.05 kg/m². indicating that she is morbidly obese (BMI > 40 or > 35 with obesity - related health condition). Obesity-related health conditions include the following: diabetes mellitus. Obesity is improving with lifestyle modifications. BMI is is above average; no BMI management plan is appropriate. We discussed portion control and increasing exercise..    RV annual exam/prn.   Diagnoses and all orders for this visit:    1. Encounter for gynecological examination  without abnormal finding (Primary)  -     Liquid-based Pap Smear, Screening  -     Trichomonas vaginalis, PCR - ThinPrep Vial, Cervix  -     HPV DNA Probe, Direct - ThinPrep Vial, Cervix    2. Non-smoker    3. Obesity, Class III, BMI 40-49.9 (morbid obesity) (CMS/[facility])    4. Encounter for screening mammogram for malignant neoplasm of breast  -     Mammo Screening Digital Tomosynthesis Bilateral With CAD; Future    5. Trichomonal vaginitis

## 2021-08-19 LAB — TRICHOMONAS VAGINALIS PCR: DETECTED

## 2021-08-19 RX ORDER — METRONIDAZOLE 500 MG/1
500 TABLET ORAL 2 TIMES DAILY
Qty: 14 TABLET | Refills: 0 | Status: SHIPPED | OUTPATIENT
Start: 2021-08-19 | End: 2021-08-26

## 2021-08-30 NOTE — PATIENT INSTRUCTIONS
"BMI for Adults  What is BMI?  Body mass index (BMI) is a number that is calculated from a person's weight and height. BMI can help estimate how much of a person's weight is composed of fat. BMI does not measure body fat directly. Rather, it is an alternative to procedures that directly measure body fat, which can be difficult and expensive.  BMI can help identify people who may be at higher risk for certain medical problems.  What are BMI measurements used for?  BMI is used as a screening tool to identify possible weight problems. It helps determine whether a person is obese, overweight, a healthy weight, or underweight.  BMI is useful for:  · Identifying a weight problem that may be related to a medical condition or may increase the risk for medical problems.  · Promoting changes, such as changes in diet and exercise, to help reach a healthy weight. BMI screening can be repeated to see if these changes are working.  How is BMI calculated?  BMI involves measuring your weight in relation to your height. Both height and weight are measured, and the BMI is calculated from those numbers. This can be done either in English ([place] or metric measurements. Note that charts and online BMI calculators are available to help you find your BMI quickly and easily without having to do these calculations yourself.  To calculate your BMI in English ([place] measurements:    1. Measure your weight in pounds (lb).  2. Multiply the number of pounds by 703.  ? For example, for a person who weighs 180 lb, multiply that number by 703, which equals 126,540.  3. Measure your height in inches. Then multiply that number by itself to get a measurement called \"inches squared.\"  ? For example, for a person who is 70 inches tall, the \"inches squared\" measurement is 70 inches x 70 inches, which equals 4,900 inches squared.  4. Divide the total from step 2 (number of lb x 703) by the total from step 3 (inches squared): 126,540 ÷ 4,900 = 25.8. This is " "your BMI.  To calculate your BMI in metric measurements:  1. Measure your weight in kilograms (kg).  2. Measure your height in meters (m). Then multiply that number by itself to get a measurement called \"meters squared.\"  ? For example, for a person who is 1.75 m tall, the \"meters squared\" measurement is 1.75 m x 1.75 m, which is equal to 3.1 meters squared.  3. Divide the number of kilograms (your weight) by the meters squared number. In this example: 70 ÷ 3.1 = 22.6. This is your BMI.  What do the results mean?  BMI charts are used to identify whether you are underweight, normal weight, overweight, or obese. The following guidelines will be used:  · Underweight: BMI less than 18.5.  · Normal weight: BMI between 18.5 and 24.9.  · Overweight: BMI between 25 and 29.9.  · Obese: BMI of 30 or above.  Keep these notes in mind:  · Weight includes both fat and muscle, so someone with a muscular build, such as an athlete, may have a BMI that is higher than 24.9. In cases like these, BMI is not an accurate measure of body fat.  · To determine if excess body fat is the cause of a BMI of 25 or higher, further assessments may need to be done by a health care provider.  · BMI is usually interpreted in the same way for men and women.  Where to find more information  For more information about BMI, including tools to quickly calculate your BMI, go to these websites:  · Centers for Disease Control and Prevention: www.cdc.gov  · American Heart Association: www.heart.org  · National Heart, Lung, and Blood [place]: www.nhlbi.nih.gov  Summary  · Body mass index (BMI) is a number that is calculated from a person's weight and height.  · BMI may help estimate how much of a person's weight is composed of fat. BMI can help identify those who may be at higher risk for certain medical problems.  · BMI can be measured using English measurements or metric measurements.  · BMI charts are used to identify whether you are underweight, normal " weight, overweight, or obese.  This information is not intended to replace advice given to you by your health care provider. Make sure you discuss any questions you have with your health care provider.  Document Revised: 09/09/2020 Document Reviewed: 07/17/2020  Elsevier Patient Education © 2021 [employer]

## 2021-09-10 RX ORDER — NORGESTIMATE AND ETHINYL ESTRADIOL 0.25-0.035
KIT ORAL
Qty: 28 TABLET | Refills: 3 | Status: SHIPPED | OUTPATIENT
Start: 2021-09-10 | End: 2022-01-06

## 2022-01-06 RX ORDER — NORGESTIMATE AND ETHINYL ESTRADIOL 0.25-0.035
KIT ORAL
Qty: 28 TABLET | Refills: 3 | Status: SHIPPED | OUTPATIENT
Start: 2022-01-06 | End: 2022-04-29

## 2022-04-29 RX ORDER — NORGESTIMATE AND ETHINYL ESTRADIOL 0.25-0.035
KIT ORAL
Qty: 28 TABLET | Refills: 3 | Status: SHIPPED | OUTPATIENT
Start: 2022-04-29 | End: 2022-08-22 | Stop reason: SDUPTHER

## 2022-06-20 ENCOUNTER — OFFICE VISIT (OUTPATIENT)
Dept: OBSTETRICS AND GYNECOLOGY | Facility: CLINIC | Age: 37
End: 2022-06-20

## 2022-06-20 VITALS
WEIGHT: 293 LBS | DIASTOLIC BLOOD PRESSURE: 90 MMHG | HEIGHT: 68 IN | SYSTOLIC BLOOD PRESSURE: 140 MMHG | BODY MASS INDEX: 44.41 KG/M2

## 2022-06-20 DIAGNOSIS — B96.89 BV (BACTERIAL VAGINOSIS): Primary | ICD-10-CM

## 2022-06-20 DIAGNOSIS — N76.0 BV (BACTERIAL VAGINOSIS): Primary | ICD-10-CM

## 2022-06-20 PROCEDURE — 99213 OFFICE O/P EST LOW 20 MIN: CPT | Performed by: NURSE PRACTITIONER

## 2022-06-20 RX ORDER — ATORVASTATIN CALCIUM 10 MG/1
10 TABLET, FILM COATED ORAL DAILY
COMMUNITY
Start: 2022-05-27

## 2022-06-20 RX ORDER — PROCHLORPERAZINE 25 MG/1
SUPPOSITORY RECTAL
COMMUNITY
Start: 2022-05-19

## 2022-06-20 RX ORDER — PROCHLORPERAZINE 25 MG/1
SUPPOSITORY RECTAL
COMMUNITY
Start: 2022-05-11

## 2022-06-20 RX ORDER — METRONIDAZOLE 500 MG/1
500 TABLET ORAL 2 TIMES DAILY
Qty: 14 TABLET | Refills: 0 | Status: SHIPPED | OUTPATIENT
Start: 2022-06-20 | End: 2022-06-27

## 2022-06-20 RX ORDER — AMLODIPINE BESYLATE 5 MG/1
TABLET ORAL
COMMUNITY
Start: 2022-05-25

## 2022-06-20 NOTE — PROGRESS NOTES
Chief Complaint   Patient presents with   • Vaginal irritation     Pt c/o vaginal irritation that started last week.  Pt denies any odor or discharge.         History:  Reginald Hassan is a 36 y.o. female who presents today for follow-up for evaluation of the above:      Vaginitis  This is a new problem. The current episode started 1 to 4 weeks ago. The problem occurs constantly. The problem has been gradually worsening. Pertinent negatives include no fever, nausea, urinary symptoms or vomiting. Associated symptoms comments: itching. Nothing aggravates the symptoms. She has tried nothing for the symptoms. The treatment provided no relief.         ROS:  Review of Systems   Constitutional: Negative for fever.   Gastrointestinal: Negative for nausea and vomiting.   Genitourinary: Positive for vaginal discharge and vaginal pain.       Ms. Hassan  reports that she has never smoked. She has never used smokeless tobacco. She reports that she does not drink alcohol and does not use drugs.      Current Outpatient Medications:   •  amLODIPine (NORVASC) 5 MG tablet, TAKE 1 TABLET (5 MG TOTAL) BY MOUTH DAILY., Disp: , Rfl:   •  atorvastatin (LIPITOR) 10 MG tablet, Take 10 mg by mouth Daily., Disp: , Rfl:   •  BD PEN NEEDLE ROBBIE U/F 32G X 4 MM misc, USE TO INJECT INSULIN 3 TIMES DAILY AS NEEDED., Disp: , Rfl:   •  citalopram (CeleXA) 10 MG tablet, Take 10 mg by mouth Daily., Disp: , Rfl:   •  Continuous Blood Gluc Sensor (Dexcom G6 Sensor), CHANGE EVERY 10 DAYS, Disp: , Rfl:   •  Continuous Blood Gluc Transmit (Dexcom G6 Transmitter) misc, CHANGE EVERY 90 DAYS, Disp: , Rfl:   •  glucose blood test strip, USE TO TEST BLOOD SUGAR 3 TIMES A DAY AS NEEDED, Disp: , Rfl:   •  Insulin Lispro, 1 Unit Dial, (HUMALOG) 100 UNIT/ML solution pen-injector, Infuse 10 Units into a venous catheter Every Night., Disp: , Rfl:   •  levothyroxine (SYNTHROID, LEVOTHROID) 88 MCG tablet, TAKE ONE TABLET DAILY GENERIC FOR SYNTHROID, Disp: 30 tablet, Rfl:  "0  •  metFORMIN (GLUCOPHAGE) 1000 MG tablet, TAKE ONE TABLET TWICE DAILY WITH MEALS GENERIC FOR GLUCOPHAGE, Disp: 60 tablet, Rfl: 0  •  norgestimate-ethinyl estradiol (ORTHO-CYCLEN) 0.25-35 MG-MCG per tablet, TAKE 1 TABLET BY MOUTH EVERY DAY, Disp: 28 tablet, Rfl: 3  •  ONETOUCH ULTRA test strip, USE TO TEST BLOOD SUGAR 3 TIMES A DAY AS NEEDED, Disp: , Rfl:   •  valsartan (DIOVAN) 80 MG tablet, Take 80 mg by mouth Daily., Disp: , Rfl:   •  VICTOZA 18 MG/3ML solution pen-injector injection, INJECT 0.6MG SUBCUTANEOUSLY ONCE DAILY X 7 DAYS THEN 1.2MG DAILY, NOT TO EXCEED 1.8MG/DAY., Disp: , Rfl:   •  metroNIDAZOLE (Flagyl) 500 MG tablet, Take 1 tablet by mouth 2 (Two) Times a Day for 7 days., Disp: 14 tablet, Rfl: 0  •  nystatin (MYCOSTATIN) 252517 UNIT/GM cream, Apply  topically to the appropriate area as directed 2 (Two) Times a Day As Needed (as needed)., Disp: 15 g, Rfl: 0      OBJECTIVE:  /90   Ht 172.7 cm (68\")   Wt 133 kg (293 lb)   LMP 05/25/2022   BMI 44.55 kg/m²    Physical Exam  Exam conducted with a chaperone present.   Constitutional:       Appearance: She is not ill-appearing.   Pulmonary:      Effort: No respiratory distress.   Genitourinary:     Labia:         Right: No rash, tenderness or lesion.         Left: No rash, tenderness or lesion.       Vagina: Vaginal discharge present.      Cervix: Discharge (thick green) present.   Neurological:      Mental Status: She is oriented to person, place, and time.   Psychiatric:         Behavior: Behavior normal.         Assessment/Plan    Diagnoses and all orders for this visit:    1. BV (bacterial vaginosis) (Primary)  -     metroNIDAZOLE (Flagyl) 500 MG tablet; Take 1 tablet by mouth 2 (Two) Times a Day for 7 days.  Dispense: 14 tablet; Refill: 0  -     NuSwab VG+ - Swab, Vagina         An After Visit Summary was printed and given to the patient at discharge.  Return in about 2 months (around 8/20/2022) for Annual physical. Sooner if problems " arise.          Adalgisa Carter APRN. 6/20/2022   Electronically Signed

## 2022-06-22 LAB
A VAGINAE DNA VAG QL NAA+PROBE: ABNORMAL SCORE
BVAB2 DNA VAG QL NAA+PROBE: ABNORMAL SCORE
C ALBICANS DNA VAG QL NAA+PROBE: NEGATIVE
C GLABRATA DNA VAG QL NAA+PROBE: NEGATIVE
C TRACH DNA VAG QL NAA+PROBE: NEGATIVE
MEGA1 DNA VAG QL NAA+PROBE: ABNORMAL SCORE
N GONORRHOEA DNA VAG QL NAA+PROBE: NEGATIVE
T VAGINALIS DNA VAG QL NAA+PROBE: NEGATIVE

## 2022-08-22 ENCOUNTER — OFFICE VISIT (OUTPATIENT)
Dept: OBSTETRICS AND GYNECOLOGY | Facility: CLINIC | Age: 37
End: 2022-08-22

## 2022-08-22 VITALS
DIASTOLIC BLOOD PRESSURE: 86 MMHG | WEIGHT: 278 LBS | HEIGHT: 68 IN | BODY MASS INDEX: 42.13 KG/M2 | SYSTOLIC BLOOD PRESSURE: 138 MMHG

## 2022-08-22 DIAGNOSIS — Z11.3 SCREENING FOR STD (SEXUALLY TRANSMITTED DISEASE): ICD-10-CM

## 2022-08-22 DIAGNOSIS — Z30.8 ENCOUNTER FOR OTHER CONTRACEPTIVE MANAGEMENT: ICD-10-CM

## 2022-08-22 DIAGNOSIS — Z80.3 FAMILY HISTORY OF BREAST CANCER: ICD-10-CM

## 2022-08-22 DIAGNOSIS — Z01.419 WOMEN'S ANNUAL ROUTINE GYNECOLOGICAL EXAMINATION: Primary | ICD-10-CM

## 2022-08-22 PROCEDURE — 99395 PREV VISIT EST AGE 18-39: CPT | Performed by: NURSE PRACTITIONER

## 2022-08-22 RX ORDER — HYDROCODONE BITARTRATE AND ACETAMINOPHEN 5; 325 MG/1; MG/1
TABLET ORAL
COMMUNITY
Start: 2022-08-18 | End: 2022-09-14

## 2022-08-22 RX ORDER — NORGESTIMATE AND ETHINYL ESTRADIOL 0.25-0.035
1 KIT ORAL DAILY
Qty: 90 TABLET | Refills: 3 | Status: SHIPPED | OUTPATIENT
Start: 2022-08-22

## 2022-08-22 RX ORDER — AMOXICILLIN 500 MG/1
CAPSULE ORAL
COMMUNITY
Start: 2022-08-18 | End: 2022-09-14

## 2022-08-22 NOTE — PROGRESS NOTES
Chief Complaint   Patient presents with   • Gynecologic Exam     Patient here for yearly exam. Last exam  with pap done 8/11/21 and was normal. Patient doing well with current OCP and needs refills sent to her pharmacy. Patient voices no complaints or concerns        History:  Reginald Hassan is a 36 y.o. female who presents today for follow-up for evaluation of the above:    HPI      Reginald Hassan is a 36 y.o. female ,  who comes to the office today for annual GYN examination. Last menstrual period was 1 week ago 08/17/2022  and her last Pap smear was 2021, and was normal. She has a history of cervical dysplasia. S/p LEEP several years ago.  She is currently on OCP for contraception and is doing well with them without complaints. Her medical history is reviewed.             ROS:  Review of Systems   Constitutional: Negative for fatigue and unexpected weight change.   HENT: Negative.    Eyes: Negative.    Respiratory: Negative.    Cardiovascular: Negative.    Gastrointestinal: Negative for abdominal pain, constipation and diarrhea.   Endocrine: Negative.    Genitourinary: Negative for difficulty urinating, dyspareunia, genital sores, menstrual problem, pelvic pain, vaginal bleeding, vaginal discharge and vaginal pain.   Musculoskeletal: Negative.    Skin: Negative.    Neurological: Negative.    Psychiatric/Behavioral: Negative.        Ms. Hassan  reports that she has never smoked. She has never used smokeless tobacco. She reports that she does not drink alcohol and does not use drugs.      Current Outpatient Medications:   •  amLODIPine (NORVASC) 5 MG tablet, TAKE 1 TABLET (5 MG TOTAL) BY MOUTH DAILY., Disp: , Rfl:   •  amoxicillin (AMOXIL) 500 MG capsule, , Disp: , Rfl:   •  atorvastatin (LIPITOR) 10 MG tablet, Take 10 mg by mouth Daily., Disp: , Rfl:   •  BD PEN NEEDLE ROBBIE U/F 32G X 4 MM misc, USE TO INJECT INSULIN 3 TIMES DAILY AS NEEDED., Disp: , Rfl:   •  citalopram (CeleXA) 10 MG tablet, Take 10 mg by mouth  "Daily., Disp: , Rfl:   •  Continuous Blood Gluc Sensor (Dexcom G6 Sensor), CHANGE EVERY 10 DAYS, Disp: , Rfl:   •  Continuous Blood Gluc Transmit (Dexcom G6 Transmitter) misc, CHANGE EVERY 90 DAYS, Disp: , Rfl:   •  glucose blood test strip, USE TO TEST BLOOD SUGAR 3 TIMES A DAY AS NEEDED, Disp: , Rfl:   •  HYDROcodone-acetaminophen (NORCO) 5-325 MG per tablet, , Disp: , Rfl:   •  Insulin Lispro, 1 Unit Dial, (HUMALOG) 100 UNIT/ML solution pen-injector, Infuse 10 Units into a venous catheter Every Night., Disp: , Rfl:   •  levothyroxine (SYNTHROID, LEVOTHROID) 88 MCG tablet, TAKE ONE TABLET DAILY GENERIC FOR SYNTHROID, Disp: 30 tablet, Rfl: 0  •  metFORMIN (GLUCOPHAGE) 1000 MG tablet, TAKE ONE TABLET TWICE DAILY WITH MEALS GENERIC FOR GLUCOPHAGE, Disp: 60 tablet, Rfl: 0  •  norgestimate-ethinyl estradiol (ORTHO-CYCLEN) 0.25-35 MG-MCG per tablet, Take 1 tablet by mouth Daily., Disp: 90 tablet, Rfl: 3  •  nystatin (MYCOSTATIN) 756646 UNIT/GM cream, Apply  topically to the appropriate area as directed 2 (Two) Times a Day As Needed (as needed)., Disp: 15 g, Rfl: 0  •  ONETOUCH ULTRA test strip, USE TO TEST BLOOD SUGAR 3 TIMES A DAY AS NEEDED, Disp: , Rfl:   •  valsartan (DIOVAN) 80 MG tablet, Take 80 mg by mouth Daily., Disp: , Rfl:   •  VICTOZA 18 MG/3ML solution pen-injector injection, INJECT 0.6MG SUBCUTANEOUSLY ONCE DAILY X 7 DAYS THEN 1.2MG DAILY, NOT TO EXCEED 1.8MG/DAY., Disp: , Rfl:       OBJECTIVE:  /86   Ht 172.7 cm (68\")   Wt 126 kg (278 lb)   Breastfeeding No   BMI 42.27 kg/m²    Physical Exam  Exam conducted with a chaperone present.   Constitutional:       Appearance: She is well-developed.   HENT:      Head: Normocephalic and atraumatic.   Eyes:      General: Lids are normal.      Conjunctiva/sclera: Conjunctivae normal.      Pupils: Pupils are equal, round, and reactive to light.   Neck:      Thyroid: No thyromegaly.   Cardiovascular:      Rate and Rhythm: Normal rate and regular rhythm.      " Heart sounds: Normal heart sounds.   Pulmonary:      Effort: Pulmonary effort is normal.      Breath sounds: Normal breath sounds.   Chest:   Breasts: Breasts are symmetrical.      Right: No inverted nipple, mass, nipple discharge, skin change or tenderness.      Left: No inverted nipple, mass, nipple discharge, skin change or tenderness.       Abdominal:      General: Bowel sounds are normal.      Palpations: Abdomen is soft.   Genitourinary:     Exam position: Supine.      Labia:         Right: No rash, tenderness, lesion or injury.         Left: No rash, tenderness, lesion or injury.       Vagina: No signs of injury and foreign body. No vaginal discharge, erythema, tenderness or bleeding.      Cervix: No cervical motion tenderness, discharge or friability.      Uterus: Not deviated, not enlarged, not fixed and not tender.       Adnexa:         Right: No mass, tenderness or fullness.          Left: No mass, tenderness or fullness.        Rectum: Normal. No tenderness or external hemorrhoid.   Musculoskeletal:         General: Normal range of motion.      Cervical back: Normal range of motion and neck supple.   Skin:     General: Skin is warm and dry.   Neurological:      Mental Status: She is alert and oriented to person, place, and time.         Assessment/Plan    Diagnoses and all orders for this visit:    1. Women's annual routine gynecological examination (Primary)  Immunizations:      - Tetanus: Unknown or >10 years ago. Recommend to have at pharmacy or on injury.      - Influenza: recommended annually      - Pneumovax:once after age 65      - Prevnar: Once after age 65      - Zostavax: Once after age 60  Colon Cancer Screening: Due at 45  Mammogram: baseline ordered due to +family history  PAP: at age 21  DEXA: DEXA scan at 65  COVID vaccine information is available at vaccine.[place].gov     2. Encounter for other contraceptive management  -     norgestimate-ethinyl estradiol (ORTHO-CYCLEN) 0.25-35 MG-MCG per  tablet; Take 1 tablet by mouth Daily.  Dispense: 90 tablet; Refill: 3    3. Screening for STD (sexually transmitted disease)  -     NuSwab VG+ & HSV         I have refilled her birth control for a year.  She will return in one year. In the meantime if she develops questions or problems, she will notify the office.       An After Visit Summary was printed and given to the patient at discharge.  Return for f/u with MD to discuss TL. Non emergent . Sooner if problems arise.          Adalgisa Carter APRN. 8/22/2022   Electronically Signed

## 2022-08-27 DIAGNOSIS — B37.31 VAGINAL YEAST INFECTION: Primary | ICD-10-CM

## 2022-08-27 LAB
A VAGINAE DNA VAG QL NAA+PROBE: ABNORMAL SCORE
BVAB2 DNA VAG QL NAA+PROBE: ABNORMAL SCORE
C ALBICANS DNA VAG QL NAA+PROBE: POSITIVE
C GLABRATA DNA VAG QL NAA+PROBE: NEGATIVE
C TRACH DNA VAG QL NAA+PROBE: NEGATIVE
HSV1 DNA SPEC QL NAA+PROBE: NEGATIVE
HSV2 DNA SPEC QL NAA+PROBE: NEGATIVE
MEGA1 DNA VAG QL NAA+PROBE: ABNORMAL SCORE
N GONORRHOEA DNA VAG QL NAA+PROBE: NEGATIVE
T VAGINALIS DNA VAG QL NAA+PROBE: NEGATIVE

## 2022-08-27 RX ORDER — FLUCONAZOLE 150 MG/1
150 TABLET ORAL ONCE
Qty: 1 TABLET | Refills: 0 | Status: SHIPPED | OUTPATIENT
Start: 2022-08-27 | End: 2022-08-27

## 2022-09-02 ENCOUNTER — HOSPITAL ENCOUNTER (OUTPATIENT)
Dept: MAMMOGRAPHY | Facility: HOSPITAL | Age: 37
Discharge: HOME | End: 2022-09-02
Admitting: NURSE PRACTITIONER

## 2022-09-02 ENCOUNTER — LAB (OUTPATIENT)
Dept: LAB | Facility: HOSPITAL | Age: 37
End: 2022-09-02

## 2022-09-02 DIAGNOSIS — Z80.3 FAMILY HISTORY OF BREAST CANCER: ICD-10-CM

## 2022-09-02 DIAGNOSIS — Z80.3 FAMILY HISTORY OF BREAST CANCER: Primary | ICD-10-CM

## 2022-09-02 PROCEDURE — 77063 BREAST TOMOSYNTHESIS BI: CPT

## 2022-09-02 PROCEDURE — 77067 SCR MAMMO BI INCL CAD: CPT

## 2022-09-14 ENCOUNTER — OFFICE VISIT (OUTPATIENT)
Dept: OBSTETRICS AND GYNECOLOGY | Facility: CLINIC | Age: 37
End: 2022-09-14

## 2022-09-14 VITALS
BODY MASS INDEX: 43.19 KG/M2 | DIASTOLIC BLOOD PRESSURE: 80 MMHG | SYSTOLIC BLOOD PRESSURE: 132 MMHG | WEIGHT: 285 LBS | HEIGHT: 68 IN

## 2022-09-14 DIAGNOSIS — Z30.2 ENCOUNTER FOR STERILIZATION: Primary | ICD-10-CM

## 2022-09-14 PROCEDURE — 99213 OFFICE O/P EST LOW 20 MIN: CPT | Performed by: OBSTETRICS & GYNECOLOGY

## 2022-09-14 NOTE — PROGRESS NOTES
"Subjective   Reginald Hassan is a 36 y.o. female.     Chief Complaint   Patient presents with   • Consult     Patient here today wanting to discuss tubal sterilization.        36-year-old female  1 para 1 presents to discuss permanent sterilization.  Patient is currently on birth control pills.  She does report a history of irregular cycles.  Her medical and surgical history are reviewed and up-to-date.  Of note her most recent hemoglobin A1c was 13.  She reports that she was having issues with her glucometer as well as insulin.       Review of Systems    Objective   /80   Ht 172.7 cm (68\")   Wt 129 kg (285 lb)   LMP 2022   Breastfeeding No   BMI 43.33 kg/m²   Patient's last menstrual period was 2022.  Physical Exam  Vitals and nursing note reviewed.   Constitutional:       General: She is not in acute distress.     Appearance: She is well-developed. She is morbidly obese.   HENT:      Head: Normocephalic and atraumatic.   Pulmonary:      Effort: Pulmonary effort is normal.   Musculoskeletal:         General: Normal range of motion.      Cervical back: Normal range of motion and neck supple.   Skin:     General: Skin is warm and dry.   Neurological:      Mental Status: She is alert and oriented to person, place, and time.   Psychiatric:         Behavior: Behavior normal.         Judgment: Judgment normal.           Assessment & Plan   Problems Addressed this Visit    None     Visit Diagnoses     Encounter for sterilization    -  Primary    Relevant Orders    Case Request (Completed)      Diagnoses       Codes Comments    Encounter for sterilization    -  Primary ICD-10-CM: Z30.2  ICD-9-CM: V25.2       Discussed with patient different management options for contraception.  Counseled patient on different management options including birth control pills, the patch, NuvaRing, Depo as well as Nexplanon and the IUDs.  She previously tried the Mirena but it was unable to be placed due to her " history of a LEEP.  She is interested in definitive management.  Discussed with patient risk benefits of surgery.  Risk including bleeding, infection as well as damage to underlying organs as well as anesthesia related risk discussed.  Discussed with patient that I would like for her glucose control to be better controlled prior to surgery.  She has a follow-up with her endocrinologist on October 10.  We will tentatively schedule patient for October 24.  She verbalized understanding of plan and had no further questions.  Return to clinic in 4 weeks.       Quynh Adan, DO

## 2022-09-15 ENCOUNTER — TELEPHONE (OUTPATIENT)
Dept: OBSTETRICS AND GYNECOLOGY | Facility: CLINIC | Age: 37
End: 2022-09-15

## 2022-09-15 NOTE — TELEPHONE ENCOUNTER
Contacted patient to inform her of surgery being scheduled for 10/24/22. Patient instructed to be at the hospital at 8:30 AM for a 10:30 start time. Patient has pre op testing and pre op apt with Dr Adan on 10/13/22 @ 10:15/11:15. Patient voices understanding.

## 2022-10-08 ENCOUNTER — APPOINTMENT (OUTPATIENT)
Dept: CT IMAGING | Facility: HOSPITAL | Age: 37
End: 2022-10-08

## 2022-10-08 ENCOUNTER — APPOINTMENT (OUTPATIENT)
Dept: GENERAL RADIOLOGY | Facility: HOSPITAL | Age: 37
End: 2022-10-08

## 2022-10-08 ENCOUNTER — HOSPITAL ENCOUNTER (EMERGENCY)
Facility: HOSPITAL | Age: 37
Discharge: HOME | End: 2022-10-08
Admitting: EMERGENCY MEDICINE

## 2022-10-08 VITALS
TEMPERATURE: 97.6 F | HEART RATE: 81 BPM | DIASTOLIC BLOOD PRESSURE: 82 MMHG | OXYGEN SATURATION: 97 % | HEIGHT: 68 IN | RESPIRATION RATE: 18 BRPM | BODY MASS INDEX: 42.74 KG/M2 | WEIGHT: 282 LBS | SYSTOLIC BLOOD PRESSURE: 159 MMHG

## 2022-10-08 DIAGNOSIS — R11.2 NAUSEA AND VOMITING, UNSPECIFIED VOMITING TYPE: ICD-10-CM

## 2022-10-08 DIAGNOSIS — E11.65 POORLY CONTROLLED DIABETES MELLITUS: ICD-10-CM

## 2022-10-08 DIAGNOSIS — R10.9 ABDOMINAL PAIN, UNSPECIFIED ABDOMINAL LOCATION: Primary | ICD-10-CM

## 2022-10-08 DIAGNOSIS — F12.90 MARIJUANA USE: ICD-10-CM

## 2022-10-08 LAB
ACETONE BLD QL: NEGATIVE
ALBUMIN SERPL-MCNC: 4.3 G/DL (ref 3.5–5.2)
ALBUMIN/GLOB SERPL: 1.1 G/DL
ALP SERPL-CCNC: 74 U/L (ref 39–117)
ALT SERPL W P-5'-P-CCNC: 8 U/L (ref 1–33)
AMPHET+METHAMPHET UR QL: NEGATIVE
AMPHETAMINES UR QL: NEGATIVE
ANION GAP SERPL CALCULATED.3IONS-SCNC: 14 MMOL/L (ref 5–15)
ARTERIAL PATENCY WRIST A: ABNORMAL
AST SERPL-CCNC: 8 U/L (ref 1–32)
ATMOSPHERIC PRESS: 759 MMHG
BACTERIA UR QL AUTO: ABNORMAL /HPF
BARBITURATES UR QL SCN: NEGATIVE
BASE EXCESS BLDA CALC-SCNC: -0.4 MMOL/L (ref 0–2)
BASOPHILS # BLD AUTO: 0.03 10*3/MM3 (ref 0–0.2)
BASOPHILS NFR BLD AUTO: 0.4 % (ref 0–1.5)
BDY SITE: ABNORMAL
BENZODIAZ UR QL SCN: NEGATIVE
BILIRUB SERPL-MCNC: 0.6 MG/DL (ref 0–1.2)
BILIRUB UR QL STRIP: NEGATIVE
BODY TEMPERATURE: 37 C
BUN SERPL-MCNC: 4 MG/DL (ref 6–20)
BUN/CREAT SERPL: 8.5 (ref 7–25)
BUPRENORPHINE SERPL-MCNC: NEGATIVE NG/ML
CALCIUM SPEC-SCNC: 9.5 MG/DL (ref 8.6–10.5)
CANNABINOIDS SERPL QL: POSITIVE
CHLORIDE SERPL-SCNC: 99 MMOL/L (ref 98–107)
CLARITY UR: CLEAR
CO2 SERPL-SCNC: 23 MMOL/L (ref 22–29)
COCAINE UR QL: NEGATIVE
COLOR UR: YELLOW
CREAT SERPL-MCNC: 0.47 MG/DL (ref 0.57–1)
D-LACTATE SERPL-SCNC: 3.1 MMOL/L (ref 0.5–2)
DEPRECATED RDW RBC AUTO: 39.4 FL (ref 37–54)
EGFRCR SERPLBLD CKD-EPI 2021: 126.7 ML/MIN/1.73
EOSINOPHIL # BLD AUTO: 0.08 10*3/MM3 (ref 0–0.4)
EOSINOPHIL NFR BLD AUTO: 1 % (ref 0.3–6.2)
ERYTHROCYTE [DISTWIDTH] IN BLOOD BY AUTOMATED COUNT: 13.3 % (ref 12.3–15.4)
ETHANOL UR QL: <0.01 %
FLUAV RNA RESP QL NAA+PROBE: NOT DETECTED
FLUBV RNA RESP QL NAA+PROBE: NOT DETECTED
GLOBULIN UR ELPH-MCNC: 3.9 GM/DL
GLUCOSE BLDC GLUCOMTR-MCNC: 190 MG/DL (ref 70–130)
GLUCOSE SERPL-MCNC: 268 MG/DL (ref 65–99)
GLUCOSE UR STRIP-MCNC: ABNORMAL MG/DL
HBA1C MFR BLD: 10.2 % (ref 4.8–5.6)
HCG SERPL QL: NEGATIVE
HCO3 BLDA-SCNC: 21.7 MMOL/L (ref 20–26)
HCT VFR BLD AUTO: 39.3 % (ref 34–46.6)
HGB BLD-MCNC: 12.8 G/DL (ref 12–15.9)
HGB UR QL STRIP.AUTO: NEGATIVE
HYALINE CASTS UR QL AUTO: ABNORMAL /LPF
IMM GRANULOCYTES # BLD AUTO: 0.02 10*3/MM3 (ref 0–0.05)
IMM GRANULOCYTES NFR BLD AUTO: 0.2 % (ref 0–0.5)
INHALED O2 CONCENTRATION: 21 %
KETONES UR QL STRIP: ABNORMAL
LEUKOCYTE ESTERASE UR QL STRIP.AUTO: NEGATIVE
LIPASE SERPL-CCNC: 15 U/L (ref 13–60)
LYMPHOCYTES # BLD AUTO: 1.57 10*3/MM3 (ref 0.7–3.1)
LYMPHOCYTES NFR BLD AUTO: 19.6 % (ref 19.6–45.3)
Lab: ABNORMAL
MAGNESIUM SERPL-MCNC: 1.7 MG/DL (ref 1.6–2.6)
MCH RBC QN AUTO: 26.6 PG (ref 26.6–33)
MCHC RBC AUTO-ENTMCNC: 32.6 G/DL (ref 31.5–35.7)
MCV RBC AUTO: 81.7 FL (ref 79–97)
METHADONE UR QL SCN: NEGATIVE
MODALITY: ABNORMAL
MONOCYTES # BLD AUTO: 0.19 10*3/MM3 (ref 0.1–0.9)
MONOCYTES NFR BLD AUTO: 2.4 % (ref 5–12)
NEUTROPHILS NFR BLD AUTO: 6.12 10*3/MM3 (ref 1.7–7)
NEUTROPHILS NFR BLD AUTO: 76.4 % (ref 42.7–76)
NITRITE UR QL STRIP: NEGATIVE
NRBC BLD AUTO-RTO: 0 /100 WBC (ref 0–0.2)
OPIATES UR QL: NEGATIVE
OXYCODONE UR QL SCN: NEGATIVE
PCO2 BLDA: 27.3 MM HG (ref 35–45)
PCO2 TEMP ADJ BLD: 27.3 MM HG (ref 35–45)
PCP UR QL SCN: NEGATIVE
PH BLDA: 7.51 PH UNITS (ref 7.35–7.45)
PH UR STRIP.AUTO: 7 [PH] (ref 5–8)
PH, TEMP CORRECTED: 7.51 PH UNITS (ref 7.35–7.45)
PLATELET # BLD AUTO: 426 10*3/MM3 (ref 140–450)
PMV BLD AUTO: 10.3 FL (ref 6–12)
PO2 BLDA: 109 MM HG (ref 83–108)
PO2 TEMP ADJ BLD: 109 MM HG (ref 83–108)
POTASSIUM SERPL-SCNC: 3.9 MMOL/L (ref 3.5–5.2)
PROCALCITONIN SERPL-MCNC: <0.02 NG/ML (ref 0–0.25)
PROPOXYPH UR QL: NEGATIVE
PROT SERPL-MCNC: 8.2 G/DL (ref 6–8.5)
PROT UR QL STRIP: ABNORMAL
RBC # BLD AUTO: 4.81 10*6/MM3 (ref 3.77–5.28)
RBC # UR STRIP: ABNORMAL /HPF
REF LAB TEST METHOD: ABNORMAL
RSV RNA NPH QL NAA+NON-PROBE: NOT DETECTED
SAO2 % BLDCOA: 99.8 % (ref 94–99)
SARS-COV-2 RNA RESP QL NAA+PROBE: NOT DETECTED
SODIUM SERPL-SCNC: 136 MMOL/L (ref 136–145)
SP GR UR STRIP: >1.03 (ref 1–1.03)
SQUAMOUS #/AREA URNS HPF: ABNORMAL /HPF
TRICYCLICS UR QL SCN: NEGATIVE
UROBILINOGEN UR QL STRIP: ABNORMAL
VENTILATOR MODE: ABNORMAL
WBC # UR STRIP: ABNORMAL /HPF
WBC NRBC COR # BLD: 8.01 10*3/MM3 (ref 3.4–10.8)

## 2022-10-08 PROCEDURE — 80053 COMPREHEN METABOLIC PANEL: CPT | Performed by: PHYSICIAN ASSISTANT

## 2022-10-08 PROCEDURE — 83036 HEMOGLOBIN GLYCOSYLATED A1C: CPT | Performed by: PHYSICIAN ASSISTANT

## 2022-10-08 PROCEDURE — 25010000002 MORPHINE PER 10 MG: Performed by: FAMILY MEDICINE

## 2022-10-08 PROCEDURE — 83605 ASSAY OF LACTIC ACID: CPT | Performed by: PHYSICIAN ASSISTANT

## 2022-10-08 PROCEDURE — 87040 BLOOD CULTURE FOR BACTERIA: CPT | Performed by: PHYSICIAN ASSISTANT

## 2022-10-08 PROCEDURE — 82962 GLUCOSE BLOOD TEST: CPT

## 2022-10-08 PROCEDURE — 80306 DRUG TEST PRSMV INSTRMNT: CPT | Performed by: PHYSICIAN ASSISTANT

## 2022-10-08 PROCEDURE — 82803 BLOOD GASES ANY COMBINATION: CPT

## 2022-10-08 PROCEDURE — 25010000002 DROPERIDOL PER 5 MG: Performed by: PHYSICIAN ASSISTANT

## 2022-10-08 PROCEDURE — 83690 ASSAY OF LIPASE: CPT | Performed by: PHYSICIAN ASSISTANT

## 2022-10-08 PROCEDURE — 84703 CHORIONIC GONADOTROPIN ASSAY: CPT | Performed by: PHYSICIAN ASSISTANT

## 2022-10-08 PROCEDURE — 99283 EMERGENCY DEPT VISIT LOW MDM: CPT

## 2022-10-08 PROCEDURE — 85025 COMPLETE CBC W/AUTO DIFF WBC: CPT | Performed by: PHYSICIAN ASSISTANT

## 2022-10-08 PROCEDURE — 81001 URINALYSIS AUTO W/SCOPE: CPT | Performed by: PHYSICIAN ASSISTANT

## 2022-10-08 PROCEDURE — 83735 ASSAY OF MAGNESIUM: CPT | Performed by: PHYSICIAN ASSISTANT

## 2022-10-08 PROCEDURE — 87637 SARSCOV2&INF A&B&RSV AMP PRB: CPT | Performed by: PHYSICIAN ASSISTANT

## 2022-10-08 PROCEDURE — 82009 KETONE BODYS QUAL: CPT | Performed by: PHYSICIAN ASSISTANT

## 2022-10-08 PROCEDURE — 36600 WITHDRAWAL OF ARTERIAL BLOOD: CPT

## 2022-10-08 PROCEDURE — 71045 X-RAY EXAM CHEST 1 VIEW: CPT

## 2022-10-08 PROCEDURE — 84145 PROCALCITONIN (PCT): CPT | Performed by: PHYSICIAN ASSISTANT

## 2022-10-08 PROCEDURE — 96375 TX/PRO/DX INJ NEW DRUG ADDON: CPT

## 2022-10-08 PROCEDURE — 96374 THER/PROPH/DIAG INJ IV PUSH: CPT

## 2022-10-08 PROCEDURE — 25010000002 ONDANSETRON PER 1 MG: Performed by: PHYSICIAN ASSISTANT

## 2022-10-08 PROCEDURE — 25010000002 IOPAMIDOL 61 % SOLUTION: Performed by: PHYSICIAN ASSISTANT

## 2022-10-08 PROCEDURE — 82077 ASSAY SPEC XCP UR&BREATH IA: CPT | Performed by: PHYSICIAN ASSISTANT

## 2022-10-08 PROCEDURE — 63710000001 INSULIN REGULAR HUMAN PER 5 UNITS: Performed by: PHYSICIAN ASSISTANT

## 2022-10-08 PROCEDURE — 36415 COLL VENOUS BLD VENIPUNCTURE: CPT

## 2022-10-08 PROCEDURE — 74177 CT ABD & PELVIS W/CONTRAST: CPT

## 2022-10-08 RX ORDER — PROMETHAZINE HYDROCHLORIDE 25 MG/1
25 SUPPOSITORY RECTAL EVERY 4 HOURS PRN
Qty: 12 SUPPOSITORY | Refills: 0 | Status: SHIPPED | OUTPATIENT
Start: 2022-10-08

## 2022-10-08 RX ORDER — SODIUM CHLORIDE 0.9 % (FLUSH) 0.9 %
10 SYRINGE (ML) INJECTION AS NEEDED
Status: DISCONTINUED | OUTPATIENT
Start: 2022-10-08 | End: 2022-10-08 | Stop reason: HOSPADM

## 2022-10-08 RX ORDER — METOCLOPRAMIDE HYDROCHLORIDE 5 MG/ML
10 INJECTION INTRAMUSCULAR; INTRAVENOUS ONCE
Status: DISCONTINUED | OUTPATIENT
Start: 2022-10-08 | End: 2022-10-08

## 2022-10-08 RX ORDER — ONDANSETRON 2 MG/ML
4 INJECTION INTRAMUSCULAR; INTRAVENOUS ONCE
Status: COMPLETED | OUTPATIENT
Start: 2022-10-08 | End: 2022-10-08

## 2022-10-08 RX ORDER — ONDANSETRON 4 MG/1
4 TABLET, ORALLY DISINTEGRATING ORAL EVERY 6 HOURS PRN
Qty: 12 TABLET | Refills: 0 | Status: SHIPPED | OUTPATIENT
Start: 2022-10-08

## 2022-10-08 RX ORDER — DROPERIDOL 2.5 MG/ML
2.5 INJECTION, SOLUTION INTRAMUSCULAR; INTRAVENOUS ONCE
Status: COMPLETED | OUTPATIENT
Start: 2022-10-08 | End: 2022-10-08

## 2022-10-08 RX ADMIN — ONDANSETRON 4 MG: 2 INJECTION INTRAMUSCULAR; INTRAVENOUS at 13:18

## 2022-10-08 RX ADMIN — SODIUM CHLORIDE 1000 ML: 9 INJECTION, SOLUTION INTRAVENOUS at 14:29

## 2022-10-08 RX ADMIN — MORPHINE SULFATE 4 MG: 4 INJECTION, SOLUTION INTRAMUSCULAR; INTRAVENOUS at 14:50

## 2022-10-08 RX ADMIN — INSULIN HUMAN 10 UNITS: 100 INJECTION, SOLUTION PARENTERAL at 14:29

## 2022-10-08 RX ADMIN — SODIUM CHLORIDE 1000 ML: 9 INJECTION, SOLUTION INTRAVENOUS at 13:18

## 2022-10-08 RX ADMIN — IOPAMIDOL 100 ML: 612 INJECTION, SOLUTION INTRAVENOUS at 15:24

## 2022-10-08 RX ADMIN — DROPERIDOL 2.5 MG: 2.5 INJECTION, SOLUTION INTRAMUSCULAR; INTRAVENOUS at 14:50

## 2022-10-08 NOTE — DISCHARGE INSTRUCTIONS
Please continue to use the Zofran as a first-line nausea medicine and if that does not work you may use the Phenergan suppositories.  Please stay well-hydrated with water.  Please have a bland diet avoiding any fried/greasy/fatty foods as well as green/leafy/high-fiber foods for the next few days to allow your stomach time to heal.  Please follow-up with your primary care provider for reevaluation within the next 48 hours.  It is imperative that you work on controlling your diabetes to prevent complications such as gastroparesis (nerve damage to your stomach), kidney failure, and other complications.  Please avoid use of all marijuana/THC products as this can worsening your nausea.   Should you develop any new or worsening symptoms please return to the ED for further evaluation.

## 2022-10-08 NOTE — ED PROVIDER NOTES
"Subjective   History of Present Illness    Patient is a 36-year-old female presenting to ED with abdominal pain and vomiting.  PMH significant for insulin-dependent diabetes, hypothyroidism.  Patient difficult, vomiting, excessive belching, as well as progressively decreasing bowel movements.  Patient states that she was seen at the [facility] where they told her she had a \"spot on the liver near my ovaries\" but otherwise in normal liver and abdomen.  Patient was sent home with a prescription for Zofran, Carafate, bentyl, reglan, as well as omeprazole however due to persistent and worsening nausea and vomiting she has been unable to keep any of these medications as well as her daily medications down.  Patient states that she has diaphoresis and chills but denies any known fevers.  Patient states that she is having progressively decreased flatulence per rectum as well.  Patient denies any history of bowel obstructions, any history of DKA, and has not been monitoring her sugars over the past week.  Patient denies known sick contact and states in the past 24 hours she has not been able to keep any medications down.  Patient denies any known recent sick contact, possible bad food exposure, and presents at this time for further evaluation.    Records reviewed show patient is scheduled for a laparoscopic salpingectomy by OB/GYN in 10/24/2022.    Patient most recently seen in the outpatient setting at the OB/GYN office on 9/14/2022 for encounter for sterilization and surgical planning.    Patient's last abdomen/pelvis imaging was a CT on 6/11/22 which showed no acute abnormalities, left nephrolithiasis, hepatomegaly, small nonspecific rounded peripheral calcification density in the left ovarian region.    RECORDS RECEIVED FROM [facility] show:  Patient was seen in the ED yesterday.  Patient was given Benadryl, IV fluid bolus, Toradol, Reglan, Zofran.  Lab work revealed no acute abnormalities on CBC, hyperglycemia of 224 with no " further CMP abnormalities.  Urinalysis unremarkable.  Patient was sent home with prescription for Reglan and Bentyl.  Patient had chest x-ray at that time which was unremarkable.    Review of Systems   Constitutional: Positive for chills, diaphoresis and fever (Subjective).   HENT: Negative.    Eyes: Negative.    Respiratory: Negative.    Cardiovascular: Negative.    Gastrointestinal: Positive for abdominal pain (Generalized), constipation (Decreased over the past 5 days), nausea and vomiting. Negative for abdominal distention and diarrhea.   Genitourinary: Negative.  Negative for dysuria, flank pain and hematuria.   Musculoskeletal: Negative.    Skin: Negative.    Neurological: Positive for weakness (generalized).   Psychiatric/Behavioral: Negative.    All other systems reviewed and are negative.      Past Medical History:   Diagnosis Date   • Abnormal Pap smear of cervix    • Cervical dysplasia    • Diabetes mellitus (HCC)    • Disease of thyroid gland    • Hypertension    • Hypothyroidism        No Known Allergies    Past Surgical History:   Procedure Laterality Date   • CARPAL TUNNEL RELEASE     • CERVICAL BIOPSY  W/ LOOP ELECTRODE EXCISION     •  SECTION         Family History   Problem Relation Age of Onset   • Diabetes Paternal Grandmother    • Hypertension Maternal Grandfather    • Coronary artery disease Maternal Grandfather    • Breast cancer Maternal Aunt    • Kidney disease Father    • Hypertension Father    • Ovarian cancer Neg Hx    • Uterine cancer Neg Hx    • Colon cancer Neg Hx    • Melanoma Neg Hx        Social History     Socioeconomic History   • Marital status: Single   • Number of children: 1   • Years of education: 12   Tobacco Use   • Smoking status: Never   • Smokeless tobacco: Never   Substance and Sexual Activity   • Alcohol use: No   • Drug use: No   • Sexual activity: Not Currently     Partners: Male     Birth control/protection: OCP           Objective   Physical Exam  Vitals  and nursing note reviewed.   Constitutional:       General: She is in acute distress (appears uncomfortable due to nausea).      Appearance: Normal appearance. She is well-developed and well-groomed. She is obese. She is ill-appearing. She is not diaphoretic.   HENT:      Head: Normocephalic.      Nose: Nose normal.      Mouth/Throat:      Mouth: Mucous membranes are moist.      Pharynx: Oropharynx is clear. No oropharyngeal exudate or posterior oropharyngeal erythema.   Eyes:      Conjunctiva/sclera: Conjunctivae normal.      Pupils: Pupils are equal, round, and reactive to light.   Cardiovascular:      Rate and Rhythm: Normal rate and regular rhythm.   Pulmonary:      Effort: Pulmonary effort is normal.      Breath sounds: Normal breath sounds.   Abdominal:      General: Bowel sounds are normal.      Palpations: Abdomen is soft.      Tenderness: There is abdominal tenderness (generalized). There is no right CVA tenderness, left CVA tenderness or guarding.   Musculoskeletal:         General: Normal range of motion.      Cervical back: Normal range of motion and neck supple.      Right lower leg: No edema.      Left lower leg: No edema.   Skin:     General: Skin is warm.      Coloration: Skin is not jaundiced.   Neurological:      Mental Status: She is alert and oriented to person, place, and time.      Gait: Gait normal.   Psychiatric:         Attention and Perception: Attention normal.         Mood and Affect: Mood is anxious.         Speech: Speech normal.         Behavior: Behavior normal. Behavior is cooperative.         Procedures           ED Course                                           MDM  Number of Diagnoses or Management Options     Amount and/or Complexity of Data Reviewed  Clinical lab tests: ordered and reviewed  Tests in the radiology section of CPT®: reviewed and ordered  Tests in the medicine section of CPT®: ordered and reviewed  Decide to obtain previous medical records or to obtain history  from someone other than the patient: yes  Review and summarize past medical records: yes  Discuss the patient with other providers: yes (Dr. Jian Branham (attending))    Patient Progress  Patient progress: improved      Patient is a 36-year-old female presenting to ED with abdominal pain and vomiting.  PMH significant for insulin-dependent diabetes, hypothyroidism. Lab work showed no acute findings on CBC including normal white blood cell count, stable H&H.  Initially lactic acid elevated at 3.1 for which patient was given 2 L fluid bolus.  Patient hyperglycemic at 268 with a hemoglobin A1c of 10.2 which after dose of insulin and IV fluids improved to a blood sugar of 190.  No further electrolyte disturbances including normal magnesium, no renal or hepatic dysfunction.  Low concern for pancreatitis with normal lipase.  Low concern for DKA with negative ketones, alkalotic pH of 7.509, no anion gap.  Alcohol negative.  UDS positive for THC which patient states she only intermittently inhales.  Pregnancy test negative.  COVID, influenza, RSV testing negative.  Urinalysis revealed greater than 1000 glucoseurea, greater than 160 ketones however no evidence of infection. Chest x-ray showed: No acute findings.  CT imaging of the abdomen and pelvis performed with IV contrast only showed: No acute abdominal pelvic findings, no bowel obstruction or evidence of acute bowel inflammation, normal appendix.  Patient was given a dose of morphine and had improvement of her abdominal pain.  Patient initially given Zofran with only minimal relief of her nausea for which she was then given droperidol and had significant improvement.  Patient completed 2 L fluid bolus as well as the insulin.  Patient was able to tolerate p.o. fluids without difficulty.  Advised patient importance of following up with her primary care provider for reevaluation within the next 48 hours as well as to discuss further gastroparesis work-up in the setting  of Reglan helping her outpatient.  Advise significant importance of controlling her diabetes due to concerning A1c and need for further outpatient follow-up for establishment with a diabetic counselor.  Discussed importance of hydration, bland diet.  Patient with improvement in her vital signs from initial hypertensive state.  Discussed return precautions and need for immediate return to ED should she develop any new or worsening symptoms.  Patient is appreciative with no further questions concerns, needs at this time and is stable for discharge.    Final diagnoses:   Abdominal pain, unspecified abdominal location   Nausea and vomiting, unspecified vomiting type   Marijuana use   Poorly controlled diabetes mellitus (HCC)       ED Disposition  ED Disposition     ED Disposition   Discharge    Condition   Stable    Comment   --             Ariane Blackwood, APRN  [address] [place] [id number]  [phone]    Schedule an appointment as soon as possible for a visit in 2 days      [facility] Emergency Department  [address] [place] [id number] [phone]    As needed         Medication List      New Prescriptions    ondansetron ODT 4 MG disintegrating tablet  Commonly known as: ZOFRAN-ODT  Place 1 tablet on the tongue Every 6 (Six) Hours As Needed for Nausea.     promethazine 25 MG suppository  Commonly known as: PHENERGAN  Insert 1 suppository into the rectum Every 4 (Four) Hours As Needed for Nausea or Vomiting.           Where to Get Your Medications      These medications were sent to [facility]/pharmacy #[id number] - [place], [place] - [address] AT ACROSS FROM [facility] [facility] - [phone] [facility] [phone]   [address] [place] [address]    Hours: 24-hours Phone: [phone]   · ondansetron ODT 4 MG disintegrating tablet  · promethazine 25 MG suppository          Jose Nolasco PA-C  10/08/22 [id number]

## 2022-10-12 ENCOUNTER — TELEPHONE (OUTPATIENT)
Dept: OBSTETRICS AND GYNECOLOGY | Facility: CLINIC | Age: 37
End: 2022-10-12

## 2022-10-12 NOTE — TELEPHONE ENCOUNTER
Pt called office today to cancel her upcoming surgery on 10/24/22.  Pt states she is currently experiencing GI problems and will call the office once those issues have resolved.

## 2022-10-13 LAB
BACTERIA SPEC AEROBE CULT: NORMAL
BACTERIA SPEC AEROBE CULT: NORMAL

## 2023-08-13 DIAGNOSIS — Z30.8 ENCOUNTER FOR OTHER CONTRACEPTIVE MANAGEMENT: ICD-10-CM

## 2023-08-14 RX ORDER — NORGESTIMATE AND ETHINYL ESTRADIOL 0.25-0.035
KIT ORAL
Qty: 84 TABLET | Refills: 3 | OUTPATIENT
Start: 2023-08-14

## 2023-09-21 ENCOUNTER — LAB (OUTPATIENT)
Dept: LAB | Facility: HOSPITAL | Age: 38
End: 2023-09-21

## 2023-09-21 ENCOUNTER — OFFICE VISIT (OUTPATIENT)
Dept: ENDOCRINOLOGY | Facility: CLINIC | Age: 38
End: 2023-09-21
Payer: COMMERCIAL

## 2023-09-21 VITALS
HEART RATE: 85 BPM | DIASTOLIC BLOOD PRESSURE: 90 MMHG | WEIGHT: 285.2 LBS | HEIGHT: 68 IN | OXYGEN SATURATION: 99 % | SYSTOLIC BLOOD PRESSURE: 140 MMHG | BODY MASS INDEX: 43.22 KG/M2

## 2023-09-21 DIAGNOSIS — E78.2 MIXED HYPERLIPIDEMIA: ICD-10-CM

## 2023-09-21 DIAGNOSIS — Z79.4 TYPE 2 DIABETES MELLITUS WITH HYPERGLYCEMIA, WITH LONG-TERM CURRENT USE OF INSULIN: Primary | ICD-10-CM

## 2023-09-21 DIAGNOSIS — E11.65 TYPE 2 DIABETES MELLITUS WITH HYPERGLYCEMIA, WITH LONG-TERM CURRENT USE OF INSULIN: Primary | ICD-10-CM

## 2023-09-21 DIAGNOSIS — E03.9 ACQUIRED HYPOTHYROIDISM: ICD-10-CM

## 2023-09-21 DIAGNOSIS — I10 ESSENTIAL HYPERTENSION: ICD-10-CM

## 2023-09-21 LAB
ALBUMIN SERPL-MCNC: 4 G/DL (ref 3.5–5.2)
ALBUMIN/GLOB SERPL: 1 G/DL
ALP SERPL-CCNC: 82 U/L (ref 39–117)
ALT SERPL W P-5'-P-CCNC: 12 U/L (ref 1–33)
ANION GAP SERPL CALCULATED.3IONS-SCNC: 9 MMOL/L (ref 5–15)
AST SERPL-CCNC: 13 U/L (ref 1–32)
BASOPHILS # BLD AUTO: 0.05 10*3/MM3 (ref 0–0.2)
BASOPHILS NFR BLD AUTO: 0.7 % (ref 0–1.5)
BILIRUB SERPL-MCNC: 0.3 MG/DL (ref 0–1.2)
BUN SERPL-MCNC: 7 MG/DL (ref 6–20)
BUN/CREAT SERPL: 13 (ref 7–25)
CALCIUM SPEC-SCNC: 9 MG/DL (ref 8.6–10.5)
CHLORIDE SERPL-SCNC: 101 MMOL/L (ref 98–107)
CO2 SERPL-SCNC: 25 MMOL/L (ref 22–29)
CREAT SERPL-MCNC: 0.54 MG/DL (ref 0.57–1)
DEPRECATED RDW RBC AUTO: 38.9 FL (ref 37–54)
EGFRCR SERPLBLD CKD-EPI 2021: 121.8 ML/MIN/1.73
EOSINOPHIL # BLD AUTO: 0.25 10*3/MM3 (ref 0–0.4)
EOSINOPHIL NFR BLD AUTO: 3.4 % (ref 0.3–6.2)
ERYTHROCYTE [DISTWIDTH] IN BLOOD BY AUTOMATED COUNT: 13 % (ref 12.3–15.4)
GLOBULIN UR ELPH-MCNC: 3.9 GM/DL
GLUCOSE SERPL-MCNC: 149 MG/DL (ref 65–99)
HCT VFR BLD AUTO: 36.6 % (ref 34–46.6)
HGB BLD-MCNC: 11.7 G/DL (ref 12–15.9)
IMM GRANULOCYTES # BLD AUTO: 0.02 10*3/MM3 (ref 0–0.05)
IMM GRANULOCYTES NFR BLD AUTO: 0.3 % (ref 0–0.5)
LYMPHOCYTES # BLD AUTO: 2.95 10*3/MM3 (ref 0.7–3.1)
LYMPHOCYTES NFR BLD AUTO: 40 % (ref 19.6–45.3)
MCH RBC QN AUTO: 26.3 PG (ref 26.6–33)
MCHC RBC AUTO-ENTMCNC: 32 G/DL (ref 31.5–35.7)
MCV RBC AUTO: 82.2 FL (ref 79–97)
MONOCYTES # BLD AUTO: 0.38 10*3/MM3 (ref 0.1–0.9)
MONOCYTES NFR BLD AUTO: 5.1 % (ref 5–12)
NEUTROPHILS NFR BLD AUTO: 3.73 10*3/MM3 (ref 1.7–7)
NEUTROPHILS NFR BLD AUTO: 50.5 % (ref 42.7–76)
NRBC BLD AUTO-RTO: 0 /100 WBC (ref 0–0.2)
PLATELET # BLD AUTO: 396 10*3/MM3 (ref 140–450)
PMV BLD AUTO: 10.4 FL (ref 6–12)
POTASSIUM SERPL-SCNC: 3.7 MMOL/L (ref 3.5–5.2)
PROT SERPL-MCNC: 7.9 G/DL (ref 6–8.5)
RBC # BLD AUTO: 4.45 10*6/MM3 (ref 3.77–5.28)
SODIUM SERPL-SCNC: 135 MMOL/L (ref 136–145)
WBC NRBC COR # BLD: 7.38 10*3/MM3 (ref 3.4–10.8)

## 2023-09-21 PROCEDURE — 86341 ISLET CELL ANTIBODY: CPT | Performed by: NURSE PRACTITIONER

## 2023-09-21 PROCEDURE — 84681 ASSAY OF C-PEPTIDE: CPT | Performed by: NURSE PRACTITIONER

## 2023-09-21 PROCEDURE — 99214 OFFICE O/P EST MOD 30 MIN: CPT | Performed by: NURSE PRACTITIONER

## 2023-09-21 PROCEDURE — 86376 MICROSOMAL ANTIBODY EACH: CPT | Performed by: NURSE PRACTITIONER

## 2023-09-21 PROCEDURE — 36415 COLL VENOUS BLD VENIPUNCTURE: CPT | Performed by: NURSE PRACTITIONER

## 2023-09-21 PROCEDURE — 80050 GENERAL HEALTH PANEL: CPT | Performed by: NURSE PRACTITIONER

## 2023-09-21 PROCEDURE — 83036 HEMOGLOBIN GLYCOSYLATED A1C: CPT | Performed by: NURSE PRACTITIONER

## 2023-09-21 RX ORDER — INSULIN PMP CART,AUT,G6/7,CNTR
1 EACH SUBCUTANEOUS
Qty: 1 KIT | Refills: 0 | Status: SHIPPED | OUTPATIENT
Start: 2023-09-21

## 2023-09-21 RX ORDER — INSULIN PMP CART,AUT,G6/7,CNTR
1 EACH SUBCUTANEOUS
Qty: 10 EACH | Refills: 11 | Status: SHIPPED | OUTPATIENT
Start: 2023-09-21

## 2023-09-21 NOTE — PROGRESS NOTES
"Chief Complaint  Hypothyroidism and Diabetes    Subjective          Reginald Hassan presents to [facility] END[facility]  History of Present Illness        In office visit     Referring provider SYLWIA Reardon     Chief Complaint   Patient presents with    Hypothyroidism    Diabetes       HPI    37 year old female presents for consultation         Reason -- diabetes mellitus type 2    Duration-- diagnosed in 2010     Context -- routine lab work     Timing - constant     Quality  not controlled     Severity moderate     Macrovascular complications- none    Microvascular complications - none    Current diabetes regimen     Using the omnipod arrow         Current glucose monitoring     Checks 4 times daily     Using dexcom G6     See below for download     Hypothyroidism     Taking levothyroxine           Review of Systems - General ROS: negative          Objective   Vital Signs:   /90   Pulse 85   Ht 172.7 cm (68\")   Wt 129 kg (285 lb 3.2 oz)   SpO2 99%   BMI 43.36 kg/m²     Physical Exam  Constitutional:       Appearance: Normal appearance.   Cardiovascular:      Rate and Rhythm: Regular rhythm.      Heart sounds: Normal heart sounds.   Musculoskeletal:         General: Normal range of motion.      Cervical back: Normal range of motion.   Neurological:      Mental Status: She is alert.      Result Review :     Common labs          10/8/2022    13:16   Common Labs   Glucose 268    BUN 4    Creatinine 0.47    Sodium 136    Potassium 3.9    Chloride 99    Calcium 9.5    Albumin 4.30    Total Bilirubin 0.6    Alkaline Phosphatase 74    AST (SGOT) 8    ALT (SGPT) 8    WBC 8.01    Hemoglobin 12.8    Hematocrit 39.3    Platelets 426    Hemoglobin A1C 10.20                Assessment and Plan    Diagnoses and all orders for this visit:    1. Type 2 diabetes mellitus with hyperglycemia, with long-term current use of insulin (Primary)  -     CBC & Differential  -     Comprehensive " Metabolic Panel  -     Hemoglobin A1c  -     TSH  -     C-Peptide  -     Thyroid Peroxidase Antibody  -     Anti-islet Cell Antibody  -     Glutamic Acid Decarboxylase  -     ZNT8 Antibodies    2. Acquired hypothyroidism  -     CBC & Differential  -     Comprehensive Metabolic Panel  -     Hemoglobin A1c  -     TSH  -     C-Peptide  -     Thyroid Peroxidase Antibody  -     Anti-islet Cell Antibody  -     Glutamic Acid Decarboxylase  -     ZNT8 Antibodies    3. Mixed hyperlipidemia    4. Essential hypertension    Other orders  -     Insulin Disposable Pump (Omnipod 5 G6 Intro, Gen 5,) kit; Use 1 kit Every 72 (Seventy-Two) Hours.  Dispense: 1 kit; Refill: 0  -     Insulin Disposable Pump (Omnipod 5 G6 Pod, Gen 5,) misc; Use 1 each Every 72 (Seventy-Two) Hours.  Dispense: 10 each; Refill: 11              Glycemic Management:    Diabetes mellitus type 2      Lab Results   Component Value Date    HGBA1C 10.20 (H) 10/08/2022       Dexcom G6      Ambulatory Glucose Profile Report    Days Analyzed : 2 week period ending on 09/21/23      Continuous Glucose Monitory Device:  Dexcom G6     40% very high target range  33% high target range  27% in target range  0% below target range  GMI 8.8 %  Average glucose 230 mg/dl    Interpretation : Diabetes Type 2 Uncontrolled      Hyperglycemia all the time     Add ozempic     She did not want adjust insulin       Taking metformin 1000 mg BID       Using omnipod        Basal     MN - 3.5       Carb ratio      MN - 2       Correction     5     Target     160         Goals for sugar    Fasting and before meals 80 to 130    2 hours after meals 180 or less      Aim for 45 grams of carbohydrate per meal    Aim for 15 grams of carbohydrate per snack        Microvascular Complications Monitoring       Last eye exam overdue     Neuropathy none      Lipid Management:     Taking Lipitor 10 mg one at night       Blood Pressure Management:      Taking Norvasc 5 mg one daily     Taking Diovan 80 mg  one daily       Thyroid Health    Hypothyroidism       Taking levothyroxine 88 mcg daily         Records from Mrs. Ndiaye received and reviewed from 2023   Thank your for this consultation          Follow Up   Return in about 3 months (around 12/21/2023) for Recheck, labs today, Video visit.  Patient was given instructions and counseling regarding her condition or for health maintenance advice. Please see specific information pulled into the AVS if appropriate.         This document has been electronically signed by SYLWIA Robison on September 21, 2023 16:53 CDT.

## 2023-09-22 ENCOUNTER — TELEPHONE (OUTPATIENT)
Dept: ENDOCRINOLOGY | Facility: CLINIC | Age: 38
End: 2023-09-22
Payer: COMMERCIAL

## 2023-09-22 LAB
HBA1C MFR BLD: 9.3 % (ref 4.8–5.6)
TSH SERPL DL<=0.05 MIU/L-ACNC: 4.35 UIU/ML (ref 0.27–4.2)

## 2023-09-22 NOTE — TELEPHONE ENCOUNTER
----- Message from SYLWIA Robison sent at 9/22/2023  8:43 AM CDT -----  Thyroid was mildly low at 4.3 and normalis 4.2 -- no dosage change; A1c was 9.3% other labs still pending

## 2023-09-23 LAB
C PEPTIDE SERPL-MCNC: 2.7 NG/ML (ref 1.1–4.4)
THYROPEROXIDASE AB SERPL-ACNC: 14 IU/ML (ref 0–34)

## 2023-09-26 LAB
GAD65 AB SER IA-ACNC: <5 U/ML (ref 0–5)
PANC ISLET CELL AB TITR SER: NEGATIVE {TITER}

## 2023-09-28 ENCOUNTER — OFFICE VISIT (OUTPATIENT)
Dept: OBSTETRICS AND GYNECOLOGY | Facility: CLINIC | Age: 38
End: 2023-09-28
Payer: COMMERCIAL

## 2023-09-28 VITALS
SYSTOLIC BLOOD PRESSURE: 162 MMHG | BODY MASS INDEX: 43.19 KG/M2 | HEIGHT: 68 IN | DIASTOLIC BLOOD PRESSURE: 98 MMHG | WEIGHT: 285 LBS

## 2023-09-28 DIAGNOSIS — I10 ELEVATED BLOOD PRESSURE READING WITH DIAGNOSIS OF HYPERTENSION: ICD-10-CM

## 2023-09-28 DIAGNOSIS — N76.4 ABSCESS, VULVA: ICD-10-CM

## 2023-09-28 DIAGNOSIS — Z01.419 WOMEN'S ANNUAL ROUTINE GYNECOLOGICAL EXAMINATION: Primary | ICD-10-CM

## 2023-09-28 DIAGNOSIS — E66.01 MORBID OBESITY WITH BMI OF 40.0-44.9, ADULT: ICD-10-CM

## 2023-09-28 DIAGNOSIS — L73.2 HIDRADENITIS SUPPURATIVA: ICD-10-CM

## 2023-09-28 DIAGNOSIS — Z30.013 ENCOUNTER FOR INITIAL PRESCRIPTION OF INJECTABLE CONTRACEPTIVE: ICD-10-CM

## 2023-09-28 RX ORDER — INSULIN ASPART 100 [IU]/ML
INJECTION, SOLUTION INTRAVENOUS; SUBCUTANEOUS
COMMUNITY

## 2023-09-28 RX ORDER — MEDROXYPROGESTERONE ACETATE 150 MG/ML
150 INJECTION, SUSPENSION INTRAMUSCULAR
Qty: 1 ML | Refills: 11 | Status: SHIPPED | OUTPATIENT
Start: 2023-09-28

## 2023-09-28 RX ORDER — DOXYCYCLINE HYCLATE 50 MG/1
100 CAPSULE ORAL 2 TIMES DAILY
Qty: 120 CAPSULE | Refills: 2 | Status: SHIPPED | OUTPATIENT
Start: 2023-09-28 | End: 2023-10-28

## 2023-09-28 RX ORDER — FLUTICASONE PROPIONATE 50 MCG
1 SPRAY, SUSPENSION (ML) NASAL DAILY
COMMUNITY

## 2023-09-28 RX ORDER — VALSARTAN 160 MG/1
160 TABLET ORAL DAILY
COMMUNITY

## 2023-09-28 RX ORDER — LIRAGLUTIDE 6 MG/ML
1.2 INJECTION SUBCUTANEOUS DAILY
COMMUNITY
Start: 2022-10-28

## 2023-09-28 NOTE — PROGRESS NOTES
Chief Complaint   Patient presents with    Gynecologic Exam     Pt is here for annual well GYN exam. Last well GYN exam 22,  pap  normal/-HPV.  Pt c/o an abscess/knot on her suprapubic area that comes up with periods, having drainage sometimes.  She would like to discuss switching to Depo Shot from OCP. Pt denies current pelvic pain, abnormal vaginal discharge or bleeding, dyspareunia, and voices no other complaints.        History:  eRginald Hassan is a 37 y.o. female who presents today for evaluation of the above problems.       Reginald Hassan is a 37 y.o. female ,  who comes to the office today for annual GYN examination. Last menstrual period was 2023  and her last Pap smear was , and was normal. She has no history of cervical dysplasia. She is currently on OCP for contraception but would like to change to depo.  Her medical history is reviewed.             ROS:  Review of Systems   Constitutional: Negative.    HENT: Negative.     Eyes: Negative.    Respiratory: Negative.     Cardiovascular: Negative.    Gastrointestinal: Negative.    Endocrine: Negative.    Genitourinary:  Positive for menstrual problem.   Musculoskeletal: Negative.    Skin:  Positive for wound.   Neurological: Negative.    Psychiatric/Behavioral: Negative.       No Known Allergies  Past Medical History:   Diagnosis Date    Abnormal Pap smear of cervix     Cervical dysplasia     Diabetes mellitus     Disease of thyroid gland     Hypertension     Hypothyroidism      Past Surgical History:   Procedure Laterality Date    CARPAL TUNNEL RELEASE      CERVICAL BIOPSY  W/ LOOP ELECTRODE EXCISION       SECTION      COLONOSCOPY  2023    ENDOSCOPY  2023     Family History   Problem Relation Age of Onset    Diabetes Paternal Grandmother     Hypertension Maternal Grandfather     Coronary artery disease Maternal Grandfather     Breast cancer Maternal Aunt     Kidney disease Father     Hypertension Father     Ovarian cancer Neg  Hx     Uterine cancer Neg Hx     Colon cancer Neg Hx     Melanoma Neg Hx       reports that she has quit smoking. Her smoking use included cigarettes. She has never used smokeless tobacco. She reports that she does not drink alcohol and does not use drugs.      Current Outpatient Medications:     amLODIPine (NORVASC) 5 MG tablet, TAKE 1 TABLET (5 MG TOTAL) BY MOUTH DAILY., Disp: , Rfl:     atorvastatin (LIPITOR) 10 MG tablet, Take 1 tablet by mouth Daily., Disp: , Rfl:     BD PEN NEEDLE ROBBIE U/F 32G X 4 MM misc, USE TO INJECT INSULIN 3 TIMES DAILY AS NEEDED., Disp: , Rfl:     citalopram (CeleXA) 10 MG tablet, Take 1 tablet by mouth Daily., Disp: , Rfl:     Continuous Blood Gluc Sensor (Dexcom G6 Sensor), CHANGE EVERY 10 DAYS, Disp: , Rfl:     Continuous Blood Gluc Transmit (Dexcom G6 Transmitter) misc, CHANGE EVERY 90 DAYS, Disp: , Rfl:     fluticasone (FLONASE) 50 MCG/ACT nasal spray, 1 spray into the nostril(s) as directed by provider Daily., Disp: , Rfl:     glucose blood test strip, USE TO TEST BLOOD SUGAR 3 TIMES A DAY AS NEEDED, Disp: , Rfl:     Insulin Aspart (novoLOG) 100 UNIT/ML injection, Inject  under the skin into the appropriate area as directed., Disp: , Rfl:     Insulin Disposable Pump (Omnipod 5 G6 Intro, Gen 5,) kit, Use 1 kit Every 72 (Seventy-Two) Hours., Disp: 1 kit, Rfl: 0    Insulin Disposable Pump (Omnipod 5 G6 Pod, Gen 5,) misc, Use 1 each Every 72 (Seventy-Two) Hours., Disp: 10 each, Rfl: 11    Insulin Lispro, 1 Unit Dial, (HUMALOG) 100 UNIT/ML solution pen-injector, 200 Units by Other route Daily., Disp: , Rfl:     levothyroxine (SYNTHROID, LEVOTHROID) 88 MCG tablet, TAKE ONE TABLET DAILY GENERIC FOR SYNTHROID, Disp: 30 tablet, Rfl: 0    Liraglutide (Victoza) 18 MG/3ML solution pen-injector injection, Inject 1.2 mg under the skin into the appropriate area as directed Daily., Disp: , Rfl:     metFORMIN (GLUCOPHAGE) 1000 MG tablet, TAKE ONE TABLET TWICE DAILY WITH MEALS GENERIC FOR  "GLUCOPHAGE, Disp: 60 tablet, Rfl: 0    ondansetron ODT (ZOFRAN-ODT) 4 MG disintegrating tablet, Place 1 tablet on the tongue Every 6 (Six) Hours As Needed for Nausea., Disp: 12 tablet, Rfl: 0    ONETOUCH ULTRA test strip, USE TO TEST BLOOD SUGAR 3 TIMES A DAY AS NEEDED, Disp: , Rfl:     promethazine (PHENERGAN) 25 MG suppository, Insert 1 suppository into the rectum Every 4 (Four) Hours As Needed for Nausea or Vomiting., Disp: 12 suppository, Rfl: 0    valsartan (DIOVAN) 160 MG tablet, Take 1 tablet by mouth Daily., Disp: , Rfl:     doxycycline (VIBRAMYCIN) 50 MG capsule, Take 2 capsules by mouth 2 (Two) Times a Day for 30 days., Disp: 120 capsule, Rfl: 2    medroxyPROGESTERone (Depo-Provera) 150 MG/ML injection, Inject 1 mL into the appropriate muscle as directed by prescriber Every 3 (Three) Months., Disp: 1 mL, Rfl: 11    mupirocin (BACTROBAN) 2 % ointment, Apply 1 application  topically to the appropriate area as directed 3 (Three) Times a Day., Disp: 30 g, Rfl: 3    OBJECTIVE:  /98   Ht 172.7 cm (68\")   Wt 129 kg (285 lb)   LMP 09/04/2023 (Approximate)   BMI 43.33 kg/m²    Physical Exam  Exam conducted with a chaperone present.   Constitutional:       Appearance: She is well-developed.   HENT:      Head: Normocephalic and atraumatic.   Eyes:      General: Lids are normal.      Conjunctiva/sclera: Conjunctivae normal.      Pupils: Pupils are equal, round, and reactive to light.   Neck:      Thyroid: No thyromegaly.   Cardiovascular:      Rate and Rhythm: Normal rate and regular rhythm.      Heart sounds: Normal heart sounds.   Pulmonary:      Effort: Pulmonary effort is normal.      Breath sounds: Normal breath sounds.   Chest:   Breasts:     Breasts are symmetrical.      Right: No inverted nipple, mass, nipple discharge, skin change or tenderness.      Left: No inverted nipple, mass, nipple discharge, skin change or tenderness.   Abdominal:      General: Bowel sounds are normal.      Palpations: " Abdomen is soft.   Genitourinary:     Exam position: Supine.      Labia:         Right: No rash, tenderness, lesion or injury.         Left: No rash, tenderness, lesion or injury.       Vagina: No signs of injury and foreign body. No vaginal discharge, erythema, tenderness or bleeding.      Cervix: No cervical motion tenderness, discharge or friability.      Uterus: Not deviated, not enlarged, not fixed and not tender.       Adnexa:         Right: No mass, tenderness or fullness.          Left: No mass, tenderness or fullness.        Rectum: Normal. No tenderness or external hemorrhoid.   Musculoskeletal:         General: Normal range of motion.      Cervical back: Normal range of motion and neck supple.   Skin:     General: Skin is warm and dry.      Findings: Abscess present.          Neurological:      Mental Status: She is alert and oriented to person, place, and time.       Assessment/Plan    Diagnoses and all orders for this visit:    1. Women's annual routine gynecological examination (Primary)  Immunizations:      - Tetanus: Unknown or >10 years ago. Recommend to have at pharmacy or on injury.      - Influenza: recommended annually      - Pneumovax:once after age 65      - Prevnar: Once after age 65      - Zostavax: Once after age 60  Colon Cancer Screening: Due at 45  Mammogram: Due at 40.  PAP: due 2024  DEXA: DEXA scan at 65  COVID vaccine information is available at vaccine.[place].gov    2. Elevated blood pressure reading with diagnosis of hypertension  Recommended close f/u with PCP  3. Morbid obesity with BMI of 40.0-44.9, adult  Recommended attention to portion control and being careful about the types and timing of meals for the purpose of weight management.    4. Encounter for initial prescription of injectable contraceptive  -     medroxyPROGESTERone (Depo-Provera) 150 MG/ML injection; Inject 1 mL into the appropriate muscle as directed by prescriber Every 3 (Three) Months.  Dispense: 1 mL; Refill:  11    5. Abscess, vulva  -     mupirocin (BACTROBAN) 2 % ointment; Apply 1 application  topically to the appropriate area as directed 3 (Three) Times a Day.  Dispense: 30 g; Refill: 3  -     doxycycline (VIBRAMYCIN) 50 MG capsule; Take 2 capsules by mouth 2 (Two) Times a Day for 30 days.  Dispense: 120 capsule; Refill: 2  -     Anaerobic & Aerobic Culture ([facility] Only) - Swab, Vulva    6. Hidradenitis suppurativa  -     mupirocin (BACTROBAN) 2 % ointment; Apply 1 application  topically to the appropriate area as directed 3 (Three) Times a Day.  Dispense: 30 g; Refill: 3  -     doxycycline (VIBRAMYCIN) 50 MG capsule; Take 2 capsules by mouth 2 (Two) Times a Day for 30 days.  Dispense: 120 capsule; Refill: 2  -     Anaerobic & Aerobic Culture ([facility] Only) - Swab, Vulva    She will return in one year. In the meantime if she develops questions or problems, she will notify the office.     An After Visit Summary was printed and given to the patient at discharge.  Return in about 3 months (around 12/28/2023) for med check up.          Adalgisa DAO 9/29/2023   Electronically signed

## 2023-10-01 LAB — ZNT8 AB SERPL IA-ACNC: <15 U/ML

## 2023-10-02 ENCOUNTER — CLINICAL SUPPORT (OUTPATIENT)
Dept: OBSTETRICS AND GYNECOLOGY | Facility: CLINIC | Age: 38
End: 2023-10-02
Payer: COMMERCIAL

## 2023-10-02 ENCOUNTER — TELEPHONE (OUTPATIENT)
Dept: OBSTETRICS AND GYNECOLOGY | Facility: CLINIC | Age: 38
End: 2023-10-02
Payer: COMMERCIAL

## 2023-10-02 DIAGNOSIS — Z30.013 ENCOUNTER FOR INITIAL PRESCRIPTION OF INJECTABLE CONTRACEPTIVE: Primary | ICD-10-CM

## 2023-10-02 LAB
B-HCG UR QL: NEGATIVE
EXPIRATION DATE: NORMAL
INTERNAL NEGATIVE CONTROL: NEGATIVE
INTERNAL POSITIVE CONTROL: POSITIVE
Lab: NORMAL

## 2023-10-02 RX ORDER — MEDROXYPROGESTERONE ACETATE 150 MG/ML
150 INJECTION, SUSPENSION INTRAMUSCULAR ONCE
Status: COMPLETED | OUTPATIENT
Start: 2023-10-02 | End: 2023-10-02

## 2023-10-02 RX ADMIN — MEDROXYPROGESTERONE ACETATE 150 MG: 150 INJECTION, SUSPENSION INTRAMUSCULAR at 15:54

## 2023-10-07 LAB
BACTERIA SPEC AEROBE CULT: NORMAL
BACTERIA SPEC ANAEROBE CULT: NORMAL
BACTERIA SPEC CULT: NORMAL
BACTERIA SPEC CULT: NORMAL

## 2023-12-22 ENCOUNTER — APPOINTMENT (OUTPATIENT)
Dept: CT IMAGING | Facility: HOSPITAL | Age: 38
End: 2023-12-22
Payer: COMMERCIAL

## 2023-12-22 ENCOUNTER — HOSPITAL ENCOUNTER (EMERGENCY)
Facility: HOSPITAL | Age: 38
Discharge: HOME | End: 2023-12-23
Attending: EMERGENCY MEDICINE
Payer: COMMERCIAL

## 2023-12-22 DIAGNOSIS — N39.0 ACUTE UTI (URINARY TRACT INFECTION): ICD-10-CM

## 2023-12-22 DIAGNOSIS — E87.6 HYPOKALEMIA: ICD-10-CM

## 2023-12-22 DIAGNOSIS — R10.9 ACUTE ABDOMINAL PAIN: Primary | ICD-10-CM

## 2023-12-22 LAB
ALBUMIN SERPL-MCNC: 4.2 G/DL (ref 3.5–5.2)
ALBUMIN/GLOB SERPL: 1.2 G/DL
ALP SERPL-CCNC: 73 U/L (ref 39–117)
ALT SERPL W P-5'-P-CCNC: 11 U/L (ref 1–33)
ANION GAP SERPL CALCULATED.3IONS-SCNC: 13 MMOL/L (ref 5–15)
AST SERPL-CCNC: 10 U/L (ref 1–32)
BASOPHILS # BLD AUTO: 0.03 10*3/MM3 (ref 0–0.2)
BASOPHILS NFR BLD AUTO: 0.3 % (ref 0–1.5)
BILIRUB SERPL-MCNC: 0.9 MG/DL (ref 0–1.2)
BUN SERPL-MCNC: 5 MG/DL (ref 6–20)
BUN/CREAT SERPL: 6.6 (ref 7–25)
CALCIUM SPEC-SCNC: 9.6 MG/DL (ref 8.6–10.5)
CHLORIDE SERPL-SCNC: 96 MMOL/L (ref 98–107)
CO2 SERPL-SCNC: 28 MMOL/L (ref 22–29)
CREAT SERPL-MCNC: 0.76 MG/DL (ref 0.57–1)
DEPRECATED RDW RBC AUTO: 43.8 FL (ref 37–54)
EGFRCR SERPLBLD CKD-EPI 2021: 103 ML/MIN/1.73
EOSINOPHIL # BLD AUTO: 0.09 10*3/MM3 (ref 0–0.4)
EOSINOPHIL NFR BLD AUTO: 0.9 % (ref 0.3–6.2)
ERYTHROCYTE [DISTWIDTH] IN BLOOD BY AUTOMATED COUNT: 15 % (ref 12.3–15.4)
GLOBULIN UR ELPH-MCNC: 3.5 GM/DL
GLUCOSE SERPL-MCNC: 84 MG/DL (ref 65–99)
HCT VFR BLD AUTO: 38.6 % (ref 34–46.6)
HGB BLD-MCNC: 12.3 G/DL (ref 12–15.9)
HOLD SPECIMEN: NORMAL
IMM GRANULOCYTES # BLD AUTO: 0.03 10*3/MM3 (ref 0–0.05)
IMM GRANULOCYTES NFR BLD AUTO: 0.3 % (ref 0–0.5)
LIPASE SERPL-CCNC: 9 U/L (ref 13–60)
LYMPHOCYTES # BLD AUTO: 2.91 10*3/MM3 (ref 0.7–3.1)
LYMPHOCYTES NFR BLD AUTO: 29 % (ref 19.6–45.3)
MAGNESIUM SERPL-MCNC: 1.9 MG/DL (ref 1.6–2.6)
MCH RBC QN AUTO: 25.9 PG (ref 26.6–33)
MCHC RBC AUTO-ENTMCNC: 31.9 G/DL (ref 31.5–35.7)
MCV RBC AUTO: 81.3 FL (ref 79–97)
MONOCYTES # BLD AUTO: 0.48 10*3/MM3 (ref 0.1–0.9)
MONOCYTES NFR BLD AUTO: 4.8 % (ref 5–12)
NEUTROPHILS NFR BLD AUTO: 6.48 10*3/MM3 (ref 1.7–7)
NEUTROPHILS NFR BLD AUTO: 64.7 % (ref 42.7–76)
NRBC BLD AUTO-RTO: 0 /100 WBC (ref 0–0.2)
PLATELET # BLD AUTO: 425 10*3/MM3 (ref 140–450)
PMV BLD AUTO: 10.3 FL (ref 6–12)
POTASSIUM SERPL-SCNC: 2.9 MMOL/L (ref 3.5–5.2)
PROT SERPL-MCNC: 7.7 G/DL (ref 6–8.5)
RBC # BLD AUTO: 4.75 10*6/MM3 (ref 3.77–5.28)
SODIUM SERPL-SCNC: 137 MMOL/L (ref 136–145)
WBC NRBC COR # BLD AUTO: 10.02 10*3/MM3 (ref 3.4–10.8)
WHOLE BLOOD HOLD COAG: NORMAL
WHOLE BLOOD HOLD SPECIMEN: NORMAL

## 2023-12-22 PROCEDURE — 96375 TX/PRO/DX INJ NEW DRUG ADDON: CPT

## 2023-12-22 PROCEDURE — 96365 THER/PROPH/DIAG IV INF INIT: CPT

## 2023-12-22 PROCEDURE — 80053 COMPREHEN METABOLIC PANEL: CPT

## 2023-12-22 PROCEDURE — 83690 ASSAY OF LIPASE: CPT

## 2023-12-22 PROCEDURE — 84703 CHORIONIC GONADOTROPIN ASSAY: CPT | Performed by: EMERGENCY MEDICINE

## 2023-12-22 PROCEDURE — 25010000002 POTASSIUM CHLORIDE PER 2 MEQ: Performed by: EMERGENCY MEDICINE

## 2023-12-22 PROCEDURE — 36415 COLL VENOUS BLD VENIPUNCTURE: CPT

## 2023-12-22 PROCEDURE — 85025 COMPLETE CBC W/AUTO DIFF WBC: CPT

## 2023-12-22 PROCEDURE — 25010000002 MORPHINE PER 10 MG: Performed by: EMERGENCY MEDICINE

## 2023-12-22 PROCEDURE — 99285 EMERGENCY DEPT VISIT HI MDM: CPT

## 2023-12-22 PROCEDURE — 83735 ASSAY OF MAGNESIUM: CPT | Performed by: EMERGENCY MEDICINE

## 2023-12-22 PROCEDURE — 25810000003 SODIUM CHLORIDE 0.9 % SOLUTION: Performed by: EMERGENCY MEDICINE

## 2023-12-22 RX ORDER — POTASSIUM CHLORIDE 20 MEQ/1
40 TABLET, EXTENDED RELEASE ORAL ONCE
Status: COMPLETED | OUTPATIENT
Start: 2023-12-22 | End: 2023-12-22

## 2023-12-22 RX ORDER — POTASSIUM CHLORIDE 29.8 MG/ML
20 INJECTION INTRAVENOUS ONCE
Status: COMPLETED | OUTPATIENT
Start: 2023-12-22 | End: 2023-12-22

## 2023-12-22 RX ORDER — SODIUM CHLORIDE 0.9 % (FLUSH) 0.9 %
10 SYRINGE (ML) INJECTION AS NEEDED
Status: DISCONTINUED | OUTPATIENT
Start: 2023-12-22 | End: 2023-12-23 | Stop reason: HOSPADM

## 2023-12-22 RX ADMIN — MORPHINE SULFATE 4 MG: 4 INJECTION, SOLUTION INTRAMUSCULAR; INTRAVENOUS at 22:24

## 2023-12-22 RX ADMIN — POTASSIUM CHLORIDE 20 MEQ: 29.8 INJECTION, SOLUTION INTRAVENOUS at 22:18

## 2023-12-22 RX ADMIN — POTASSIUM CHLORIDE 40 MEQ: 1500 TABLET, EXTENDED RELEASE ORAL at 22:45

## 2023-12-22 RX ADMIN — SODIUM CHLORIDE 1000 ML: 9 INJECTION, SOLUTION INTRAVENOUS at 22:19

## 2023-12-23 ENCOUNTER — APPOINTMENT (OUTPATIENT)
Dept: CT IMAGING | Facility: HOSPITAL | Age: 38
End: 2023-12-23
Payer: COMMERCIAL

## 2023-12-23 VITALS
BODY MASS INDEX: 39.1 KG/M2 | DIASTOLIC BLOOD PRESSURE: 71 MMHG | OXYGEN SATURATION: 100 % | RESPIRATION RATE: 19 BRPM | TEMPERATURE: 98.2 F | HEART RATE: 89 BPM | HEIGHT: 68 IN | WEIGHT: 258 LBS | SYSTOLIC BLOOD PRESSURE: 118 MMHG

## 2023-12-23 LAB
B-HCG UR QL: NEGATIVE
BACTERIA UR QL AUTO: ABNORMAL /HPF
BILIRUB UR QL STRIP: ABNORMAL
CLARITY UR: ABNORMAL
COLOR UR: ABNORMAL
GLUCOSE UR STRIP-MCNC: ABNORMAL MG/DL
HCG SERPL QL: NEGATIVE
HGB UR QL STRIP.AUTO: NEGATIVE
HOLD SPECIMEN: NORMAL
HYALINE CASTS UR QL AUTO: ABNORMAL /LPF
KETONES UR QL STRIP: ABNORMAL
LEUKOCYTE ESTERASE UR QL STRIP.AUTO: ABNORMAL
NITRITE UR QL STRIP: POSITIVE
PH UR STRIP.AUTO: 6 [PH] (ref 5–8)
PROT UR QL STRIP: ABNORMAL
RBC # UR STRIP: ABNORMAL /HPF
REF LAB TEST METHOD: ABNORMAL
SP GR UR STRIP: >1.03 (ref 1–1.03)
SQUAMOUS #/AREA URNS HPF: ABNORMAL /HPF
UROBILINOGEN UR QL STRIP: ABNORMAL
WBC # UR STRIP: ABNORMAL /HPF

## 2023-12-23 PROCEDURE — 81025 URINE PREGNANCY TEST: CPT | Performed by: EMERGENCY MEDICINE

## 2023-12-23 PROCEDURE — 74177 CT ABD & PELVIS W/CONTRAST: CPT

## 2023-12-23 PROCEDURE — 25510000001 IOPAMIDOL 61 % SOLUTION: Performed by: EMERGENCY MEDICINE

## 2023-12-23 PROCEDURE — 81001 URINALYSIS AUTO W/SCOPE: CPT | Performed by: EMERGENCY MEDICINE

## 2023-12-23 RX ORDER — SUCRALFATE 1 G/1
1 TABLET ORAL 4 TIMES DAILY
Qty: 40 TABLET | Refills: 0 | Status: SHIPPED | OUTPATIENT
Start: 2023-12-23

## 2023-12-23 RX ORDER — CEPHALEXIN 500 MG/1
500 CAPSULE ORAL 2 TIMES DAILY
Qty: 14 CAPSULE | Refills: 0 | Status: SHIPPED | OUTPATIENT
Start: 2023-12-23 | End: 2023-12-30

## 2023-12-23 RX ORDER — PANTOPRAZOLE SODIUM 20 MG/1
20 TABLET, DELAYED RELEASE ORAL DAILY
Qty: 20 TABLET | Refills: 0 | Status: SHIPPED | OUTPATIENT
Start: 2023-12-23

## 2023-12-23 RX ADMIN — IOPAMIDOL 100 ML: 612 INJECTION, SOLUTION INTRAVENOUS at 00:15

## 2023-12-23 NOTE — ED PROVIDER NOTES
Subjective   History of Present Illness  Pt presents to the  with report of diffuse abdominal pain.  Reports had some vomiting over the weekend - this has resolved.  States feels like her insides are on fire.  No diarrhea.  No injuries.  No new foods/meds.  Denies any dysuria - states has had decreased urine output        Review of Systems   Constitutional:  Negative for chills and fever.   HENT:  Negative for congestion.    Respiratory:  Negative for shortness of breath.    Cardiovascular:  Negative for chest pain.   Gastrointestinal:  Positive for abdominal pain, nausea and vomiting. Negative for diarrhea.   Genitourinary:  Negative for dysuria and hematuria.   Musculoskeletal:  Negative for back pain.   All other systems reviewed and are negative.      Past Medical History:   Diagnosis Date    Abnormal Pap smear of cervix     Cervical dysplasia     Diabetes mellitus     Disease of thyroid gland     Hypertension     Hypothyroidism        No Known Allergies    Past Surgical History:   Procedure Laterality Date    CARPAL TUNNEL RELEASE      CERVICAL BIOPSY  W/ LOOP ELECTRODE EXCISION       SECTION      COLONOSCOPY  2023    ENDOSCOPY  2023       Family History   Problem Relation Age of Onset    Diabetes Paternal Grandmother     Hypertension Maternal Grandfather     Coronary artery disease Maternal Grandfather     Breast cancer Maternal Aunt     Kidney disease Father     Hypertension Father     Ovarian cancer Neg Hx     Uterine cancer Neg Hx     Colon cancer Neg Hx     Melanoma Neg Hx        Social History     Socioeconomic History    Marital status: Single    Number of children: 1    Years of education: 12   Tobacco Use    Smoking status: Former     Years: 30     Types: Cigarettes    Smokeless tobacco: Never   Vaping Use    Vaping Use: Never used   Substance and Sexual Activity    Alcohol use: No    Drug use: No    Sexual activity: Yes     Partners: Male     Birth control/protection: Pill, OCP            Objective   Physical Exam  Vitals and nursing note reviewed.   Constitutional:       General: She is not in acute distress.     Appearance: She is well-developed.   HENT:      Head: Normocephalic.      Mouth/Throat:      Mouth: Mucous membranes are moist.   Cardiovascular:      Rate and Rhythm: Normal rate and regular rhythm.      Heart sounds: Normal heart sounds.   Pulmonary:      Effort: Pulmonary effort is normal.      Breath sounds: Normal breath sounds.   Abdominal:      General: Abdomen is flat. Bowel sounds are normal.      Palpations: Abdomen is soft.      Tenderness: There is generalized abdominal tenderness.   Skin:     General: Skin is warm and dry.   Neurological:      Mental Status: She is alert.         Procedures           ED Course      Labs Reviewed   COMPREHENSIVE METABOLIC PANEL - Abnormal; Notable for the following components:       Result Value    BUN 5 (*)     Potassium 2.9 (*)     Chloride 96 (*)     BUN/Creatinine Ratio 6.6 (*)     All other components within normal limits    Narrative:     GFR Normal >60  Chronic Kidney Disease <60  Kidney Failure <15     LIPASE - Abnormal; Notable for the following components:    Lipase 9 (*)     All other components within normal limits   CBC WITH AUTO DIFFERENTIAL - Abnormal; Notable for the following components:    MCH 25.9 (*)     Monocyte % 4.8 (*)     All other components within normal limits   URINALYSIS W/ MICROSCOPIC IF INDICATED (NO CULTURE) - Abnormal; Notable for the following components:    Color, UA Dark Yellow (*)     Appearance, UA Cloudy (*)     Specific Gravity, UA >1.030 (*)     Glucose,  mg/dL (1+) (*)     Ketones, UA 15 mg/dL (1+) (*)     Bilirubin, UA Small (1+) (*)     Protein,  mg/dL (2+) (*)     Leuk Esterase, UA Moderate (2+) (*)     Nitrite, UA Positive (*)     All other components within normal limits   URINALYSIS, MICROSCOPIC ONLY - Abnormal; Notable for the following components:    WBC, UA 6-10 (*)      Bacteria, UA Trace (*)     Squamous Epithelial Cells, UA 7-12 (*)     All other components within normal limits   PREGNANCY, URINE - Normal   MAGNESIUM - Normal   HCG, SERUM, QUALITATIVE - Normal   RAINBOW DRAW    Narrative:     The following orders were created for panel order [facility] Draw.  Procedure                               Abnormality         Status                     ---------                               -----------         ------                     Green Top (Gel)[[id number]]                                  Final result               Lavender Top[[id number]]                                     Final result               Red Top[[id number]]                                          Final result               [facility] Blood Culture Hiro...[[id number]]                      Final result               Gray Top[[id number]]                                         Final result               Light Blue Top[[id number]]                                   Final result                 Please view results for these tests on the individual orders.   GREEN TOP   LAVENDER TOP   RED TOP   RAINBOW BLOOD CULTURE BOTTLES - 1 SET   GRAY TOP   LIGHT BLUE TOP   CBC AND DIFFERENTIAL    Narrative:     The following orders were created for panel order CBC & Differential.  Procedure                               Abnormality         Status                     ---------                               -----------         ------                     CBC Auto Differential[[id number]]        Abnormal            Final result                 Please view results for these tests on the individual orders.                                              Medical Decision Making  Pt stable in EC - resting comfortably att and in NAD.  NS abdomen.  No evid of obstruction/perf/ischemia.  Has some findings s/o UTI - will treat given nitrite +.  + low potassium - repleted in EC. Suspect gastritis. Given GI cocktail and will d/c with carafate/antacid.  Prec given  - recommend outpt f/u    Amount and/or Complexity of Data Reviewed  Labs: ordered.  Radiology: ordered.    Risk  Prescription drug management.        Final diagnoses:   Acute abdominal pain   Hypokalemia   Acute UTI (urinary tract infection)       ED Disposition  ED Disposition       ED Disposition   Discharge    Condition   Stable    Comment   --               Berenice Ndiaye, APRN  [address] [facility] [address]  [phone]               Medication List        New Prescriptions      cephalexin 500 MG capsule  Commonly known as: KEFLEX  Take 1 capsule by mouth 2 (Two) Times a Day for 7 days.     pantoprazole 20 MG EC tablet  Commonly known as: PROTONIX  Take 1 tablet by mouth Daily.     sucralfate 1 g tablet  Commonly known as: CARAFATE  Take 1 tablet by mouth 4 (Four) Times a Day.               Where to Get Your Medications        These medications were sent to [facility]/pharmacy #[id number] - [place], [place] - [address] AT ACROSS FROM [address] - [phone] [facility] [phone]   [address] [facility] [address]      Phone: [phone]   cephalexin 500 MG capsule  pantoprazole 20 MG EC tablet  sucralfate 1 g tablet            Yanick Rios, DO  12/22/23 2157       Yanick Rios, DO  12/23/23 0118

## 2023-12-28 ENCOUNTER — OFFICE VISIT (OUTPATIENT)
Dept: OBSTETRICS AND GYNECOLOGY | Facility: CLINIC | Age: 38
End: 2023-12-28
Payer: COMMERCIAL

## 2023-12-28 VITALS
DIASTOLIC BLOOD PRESSURE: 74 MMHG | HEIGHT: 68 IN | WEIGHT: 258 LBS | SYSTOLIC BLOOD PRESSURE: 118 MMHG | BODY MASS INDEX: 39.1 KG/M2

## 2023-12-28 DIAGNOSIS — N76.4 ABSCESS, VULVA: ICD-10-CM

## 2023-12-28 DIAGNOSIS — Z30.42 ENCOUNTER FOR SURVEILLANCE OF INJECTABLE CONTRACEPTIVE: Primary | ICD-10-CM

## 2023-12-28 DIAGNOSIS — L73.2 HIDRADENITIS SUPPURATIVA: ICD-10-CM

## 2023-12-28 RX ORDER — MEDROXYPROGESTERONE ACETATE 150 MG/ML
150 INJECTION, SUSPENSION INTRAMUSCULAR ONCE
Status: COMPLETED | OUTPATIENT
Start: 2023-12-28 | End: 2023-12-28

## 2023-12-28 RX ORDER — DOXYCYCLINE HYCLATE 50 MG/1
100 CAPSULE ORAL 2 TIMES DAILY
Qty: 120 CAPSULE | Refills: 2 | Status: SHIPPED | OUTPATIENT
Start: 2023-12-28 | End: 2024-01-27

## 2023-12-28 RX ADMIN — MEDROXYPROGESTERONE ACETATE 150 MG: 150 INJECTION, SUSPENSION INTRAMUSCULAR at 11:29

## 2023-12-28 NOTE — PROGRESS NOTES
Chief Complaint   Patient presents with    Med Refill     Patient here to follow up on medication check for Hidradenitis.  She has been taking Vibramycin since 9/28/23, reports doing about the same.  They go away, then come back.  Pt voices taking medication consistently.  Due for Depo Injection today as well.       History:  Reginald Hassan is a 38 y.o. female who presents today for follow-up for evaluation of the above:    HPI    Patient presents today for f/u on hidradenitis of vulva after taking doxycycline long term. She reports that she continues to have deep cystic abscess on her vulva and labia that come and go. She is also using the topical mupirocin.   She is due for her depo today.   Had negative UPT 12/23/2023        ROS:  Review of Systems   Constitutional: Negative.    HENT: Negative.     Eyes: Negative.    Respiratory: Negative.     Cardiovascular: Negative.    Gastrointestinal: Negative.    Endocrine: Negative.    Genitourinary: Negative.    Musculoskeletal: Negative.    Skin: Negative.  Positive for wound.   Neurological: Negative.    Psychiatric/Behavioral: Negative.         Ms. Hassan  reports that she has quit smoking. Her smoking use included cigarettes. She has never used smokeless tobacco. She reports that she does not drink alcohol and does not use drugs.      Current Outpatient Medications:     amLODIPine (NORVASC) 5 MG tablet, TAKE 1 TABLET (5 MG TOTAL) BY MOUTH DAILY., Disp: , Rfl:     atorvastatin (LIPITOR) 10 MG tablet, Take 1 tablet by mouth Daily., Disp: , Rfl:     BD PEN NEEDLE ROBBIE U/F 32G X 4 MM misc, USE TO INJECT INSULIN 3 TIMES DAILY AS NEEDED., Disp: , Rfl:     cephalexin (KEFLEX) 500 MG capsule, Take 1 capsule by mouth 2 (Two) Times a Day for 7 days., Disp: 14 capsule, Rfl: 0    citalopram (CeleXA) 10 MG tablet, Take 1 tablet by mouth Daily., Disp: , Rfl:     Continuous Blood Gluc Sensor (Dexcom G6 Sensor), CHANGE EVERY 10 DAYS, Disp: , Rfl:     Continuous Blood Gluc Transmit  (Dexcom G6 Transmitter) misc, CHANGE EVERY 90 DAYS, Disp: , Rfl:     doxycycline (VIBRAMYCIN) 50 MG capsule, Take 2 capsules by mouth 2 (Two) Times a Day for 30 days., Disp: 120 capsule, Rfl: 2    fluticasone (FLONASE) 50 MCG/ACT nasal spray, 1 spray into the nostril(s) as directed by provider Daily., Disp: , Rfl:     glucose blood test strip, USE TO TEST BLOOD SUGAR 3 TIMES A DAY AS NEEDED, Disp: , Rfl:     Insulin Aspart (novoLOG) 100 UNIT/ML injection, Inject  under the skin into the appropriate area as directed., Disp: , Rfl:     Insulin Disposable Pump (Omnipod 5 G6 Intro, Gen 5,) kit, Use 1 kit Every 72 (Seventy-Two) Hours., Disp: 1 kit, Rfl: 0    Insulin Disposable Pump (Omnipod 5 G6 Pod, Gen 5,) misc, Use 1 each Every 72 (Seventy-Two) Hours., Disp: 10 each, Rfl: 11    Insulin Lispro, 1 Unit Dial, (HUMALOG) 100 UNIT/ML solution pen-injector, 200 Units by Other route Daily., Disp: , Rfl:     levothyroxine (SYNTHROID, LEVOTHROID) 88 MCG tablet, TAKE ONE TABLET DAILY GENERIC FOR SYNTHROID, Disp: 30 tablet, Rfl: 0    Liraglutide (Victoza) 18 MG/3ML solution pen-injector injection, Inject 1.2 mg under the skin into the appropriate area as directed Daily., Disp: , Rfl:     medroxyPROGESTERone (Depo-Provera) 150 MG/ML injection, Inject 1 mL into the appropriate muscle as directed by prescriber Every 3 (Three) Months., Disp: 1 mL, Rfl: 11    metFORMIN (GLUCOPHAGE) 1000 MG tablet, TAKE ONE TABLET TWICE DAILY WITH MEALS GENERIC FOR GLUCOPHAGE, Disp: 60 tablet, Rfl: 0    mupirocin (BACTROBAN) 2 % ointment, Apply 1 application  topically to the appropriate area as directed 3 (Three) Times a Day., Disp: 30 g, Rfl: 3    ondansetron ODT (ZOFRAN-ODT) 4 MG disintegrating tablet, Place 1 tablet on the tongue Every 6 (Six) Hours As Needed for Nausea., Disp: 12 tablet, Rfl: 0    ONETOUCH ULTRA test strip, USE TO TEST BLOOD SUGAR 3 TIMES A DAY AS NEEDED, Disp: , Rfl:     pantoprazole (PROTONIX) 20 MG EC tablet, Take 1 tablet by  "mouth Daily., Disp: 20 tablet, Rfl: 0    promethazine (PHENERGAN) 25 MG suppository, Insert 1 suppository into the rectum Every 4 (Four) Hours As Needed for Nausea or Vomiting., Disp: 12 suppository, Rfl: 0    sucralfate (CARAFATE) 1 g tablet, Take 1 tablet by mouth 4 (Four) Times a Day., Disp: 40 tablet, Rfl: 0    valsartan (DIOVAN) 160 MG tablet, Take 1 tablet by mouth Daily., Disp: , Rfl:   No current facility-administered medications for this visit.      OBJECTIVE:  /74   Ht 172.7 cm (68\")   Wt 117 kg (258 lb)   BMI 39.23 kg/m²    Physical Exam  Exam conducted with a chaperone present.   Constitutional:       Appearance: She is obese. She is not ill-appearing.   Pulmonary:      Effort: No respiratory distress.   Genitourinary:     Labia:         Right: Lesion present.        Neurological:      Mental Status: She is oriented to person, place, and time.   Psychiatric:         Behavior: Behavior normal.         Assessment/Plan    Diagnoses and all orders for this visit:    1. Encounter for surveillance of injectable contraceptive (Primary)  -     medroxyPROGESTERone (DEPO-PROVERA) injection 150 mg    2. Hidradenitis suppurativa  -     Ambulatory Referral to Dermatology  -     doxycycline (VIBRAMYCIN) 50 MG capsule; Take 2 capsules by mouth 2 (Two) Times a Day for 30 days.  Dispense: 120 capsule; Refill: 2    3. Abscess, vulva  -     doxycycline (VIBRAMYCIN) 50 MG capsule; Take 2 capsules by mouth 2 (Two) Times a Day for 30 days.  Dispense: 120 capsule; Refill: 2    Discussed referral to dermatology. They can consider using Humira for HS.           An After Visit Summary was printed and given to the patient at discharge.  Return in about 8 months (around 8/28/2024) for Annual physical. Sooner if problems arise.          Adalgisa DAO. 12/28/2023   Electronically Signed  "

## 2024-02-20 ENCOUNTER — LAB REQUISITION (OUTPATIENT)
Dept: LAB | Facility: HOSPITAL | Age: 39
End: 2024-02-20
Payer: COMMERCIAL

## 2024-02-20 ENCOUNTER — OFFICE VISIT (OUTPATIENT)
Dept: BARIATRICS/WEIGHT MGMT | Facility: CLINIC | Age: 39
End: 2024-02-20
Payer: COMMERCIAL

## 2024-02-20 VITALS
OXYGEN SATURATION: 98 % | SYSTOLIC BLOOD PRESSURE: 148 MMHG | HEART RATE: 105 BPM | DIASTOLIC BLOOD PRESSURE: 87 MMHG | BODY MASS INDEX: 39.95 KG/M2 | TEMPERATURE: 97.8 F | HEIGHT: 68 IN | WEIGHT: 263.6 LBS

## 2024-02-20 DIAGNOSIS — L72.9 INFECTED CYST OF SKIN: Primary | ICD-10-CM

## 2024-02-20 DIAGNOSIS — L72.9 FOLLICULAR CYST OF THE SKIN AND SUBCUTANEOUS TISSUE, UNSPECIFIED: ICD-10-CM

## 2024-02-20 DIAGNOSIS — L08.9 LOCAL INFECTION OF THE SKIN AND SUBCUTANEOUS TISSUE, UNSPECIFIED: ICD-10-CM

## 2024-02-20 DIAGNOSIS — L08.9 INFECTED CYST OF SKIN: Primary | ICD-10-CM

## 2024-02-20 PROCEDURE — 87070 CULTURE OTHR SPECIMN AEROBIC: CPT | Performed by: NURSE PRACTITIONER

## 2024-02-20 PROCEDURE — 87205 SMEAR GRAM STAIN: CPT | Performed by: NURSE PRACTITIONER

## 2024-02-20 RX ORDER — SULFAMETHOXAZOLE AND TRIMETHOPRIM 400; 80 MG/1; MG/1
1 TABLET ORAL 2 TIMES DAILY
Qty: 14 TABLET | Refills: 0 | Status: SHIPPED | OUTPATIENT
Start: 2024-02-20 | End: 2024-02-27

## 2024-02-20 NOTE — PROGRESS NOTES
General Surgery   History and Physical     Referring Provider: Yasemin Andrade PA    Patient Care Team:  Berenice Ndiaye APRN as PCP - General (Nurse Practitioner)  Quynh Adan DO as Consulting Physician (Obstetrics and Gynecology)    Chief complaint: boil to pubis     Subjective      History of present illness:  The patient is a 38 y.o. female that admits to a cyst like area to her pubic area that has been present for about a year and states it comes and goes.  Patient states that the area will become larger and will draining and become smaller but never fully resolves.  She also admits to another biopsy to the right and left side of her pubic area..    Review of Systems    Review of Systems - General ROS: negative    Dermatological ROS: positive for lumps       History  Past Medical History:   Diagnosis Date    Abnormal Pap smear of cervix     Cervical dysplasia     Diabetes mellitus     Disease of thyroid gland     Hypertension     Hypothyroidism     Mixed hyperlipidemia 2023    Urolithiasis 12/15/2016   ,   Past Surgical History:   Procedure Laterality Date    CARPAL TUNNEL RELEASE      CERVICAL BIOPSY  W/ LOOP ELECTRODE EXCISION       SECTION      COLONOSCOPY  2023    ENDOSCOPY  2023   ,   Family History   Problem Relation Age of Onset    Diabetes Paternal Grandmother     Hypertension Maternal Grandfather     Coronary artery disease Maternal Grandfather     Breast cancer Maternal Aunt     Kidney disease Father     Hypertension Father     Ovarian cancer Neg Hx     Uterine cancer Neg Hx     Colon cancer Neg Hx     Melanoma Neg Hx    ,   Social History     Tobacco Use    Smoking status: Former     Years: 30     Types: Cigarettes    Smokeless tobacco: Never   Vaping Use    Vaping Use: Never used   Substance Use Topics    Alcohol use: No    Drug use: No   , (Not in a hospital admission)   and Allergies:  Patient has no known allergies.    Current Outpatient Medications:      amLODIPine (NORVASC) 5 MG tablet, TAKE 1 TABLET (5 MG TOTAL) BY MOUTH DAILY., Disp: , Rfl:     BD PEN NEEDLE ROBBIE U/F 32G X 4 MM misc, USE TO INJECT INSULIN 3 TIMES DAILY AS NEEDED., Disp: , Rfl:     citalopram (CeleXA) 10 MG tablet, Take 1 tablet by mouth Daily., Disp: , Rfl:     Continuous Blood Gluc Sensor (Dexcom G6 Sensor), CHANGE EVERY 10 DAYS, Disp: , Rfl:     Continuous Blood Gluc Transmit (Dexcom G6 Transmitter) misc, CHANGE EVERY 90 DAYS, Disp: , Rfl:     fluticasone (FLONASE) 50 MCG/ACT nasal spray, 1 spray into the nostril(s) as directed by provider Daily., Disp: , Rfl:     glucose blood test strip, USE TO TEST BLOOD SUGAR 3 TIMES A DAY AS NEEDED, Disp: , Rfl:     Insulin Aspart (novoLOG) 100 UNIT/ML injection, Inject  under the skin into the appropriate area as directed., Disp: , Rfl:     Insulin Disposable Pump (Omnipod 5 G6 Intro, Gen 5,) kit, Use 1 kit Every 72 (Seventy-Two) Hours., Disp: 1 kit, Rfl: 0    Insulin Disposable Pump (Omnipod 5 G6 Pod, Gen 5,) misc, Use 1 each Every 72 (Seventy-Two) Hours., Disp: 10 each, Rfl: 11    Insulin Lispro, 1 Unit Dial, (HUMALOG) 100 UNIT/ML solution pen-injector, 200 Units by Other route Daily., Disp: , Rfl:     levothyroxine (SYNTHROID, LEVOTHROID) 88 MCG tablet, TAKE ONE TABLET DAILY GENERIC FOR SYNTHROID, Disp: 30 tablet, Rfl: 0    Liraglutide (Victoza) 18 MG/3ML solution pen-injector injection, Inject 1.2 mg under the skin into the appropriate area as directed Daily., Disp: , Rfl:     medroxyPROGESTERone (Depo-Provera) 150 MG/ML injection, Inject 1 mL into the appropriate muscle as directed by prescriber Every 3 (Three) Months., Disp: 1 mL, Rfl: 11    metFORMIN (GLUCOPHAGE) 1000 MG tablet, TAKE ONE TABLET TWICE DAILY WITH MEALS GENERIC FOR GLUCOPHAGE, Disp: 60 tablet, Rfl: 0    ONETOUCH ULTRA test strip, USE TO TEST BLOOD SUGAR 3 TIMES A DAY AS NEEDED, Disp: , Rfl:     valsartan (DIOVAN) 160 MG tablet, Take 1 tablet by mouth Daily., Disp: , Rfl:      atorvastatin (LIPITOR) 10 MG tablet, Take 1 tablet by mouth Daily. (Patient not taking: Reported on 2/20/2024), Disp: , Rfl:     mupirocin (BACTROBAN) 2 % ointment, Apply 1 application  topically to the appropriate area as directed 3 (Three) Times a Day. (Patient not taking: Reported on 2/20/2024), Disp: 30 g, Rfl: 3    ondansetron ODT (ZOFRAN-ODT) 4 MG disintegrating tablet, Place 1 tablet on the tongue Every 6 (Six) Hours As Needed for Nausea. (Patient not taking: Reported on 2/20/2024), Disp: 12 tablet, Rfl: 0    pantoprazole (PROTONIX) 20 MG EC tablet, Take 1 tablet by mouth Daily. (Patient not taking: Reported on 2/20/2024), Disp: 20 tablet, Rfl: 0    promethazine (PHENERGAN) 25 MG suppository, Insert 1 suppository into the rectum Every 4 (Four) Hours As Needed for Nausea or Vomiting. (Patient not taking: Reported on 2/20/2024), Disp: 12 suppository, Rfl: 0    sucralfate (CARAFATE) 1 g tablet, Take 1 tablet by mouth 4 (Four) Times a Day. (Patient not taking: Reported on 2/20/2024), Disp: 40 tablet, Rfl: 0    sulfamethoxazole-trimethoprim (Bactrim) 400-80 MG tablet, Take 1 tablet by mouth 2 (Two) Times a Day for 7 days., Disp: 14 tablet, Rfl: 0    Objective     Vital Signs   Temp:  [97.8 °F (36.6 °C)] 97.8 °F (36.6 °C)  Heart Rate:  [105] 105  BP: (148)/(87) 148/87    Physical Exam:  Physical Exam  Vitals reviewed.   Constitutional:       Appearance: She is obese.   Skin:     General: Skin is warm and dry.      Findings: Erythema present.      Comments: Dime size area to center of pubis with mild drainage noted when I massage around this site and there is erythema present. There are two other marble size lumps to the right and left side of her pubis that are mobile and non tender, no erythema present to those areas.    Neurological:      Mental Status: She is alert.          Results Review:     Lab Results (last 24 hours)       ** No results found for the last 24 hours. **          Imaging Results (Last 24  Hours)       ** No results found for the last 24 hours. **            ASSESSMENT/PLAN   Diagnosis Plan   1. Infected cyst of skin  Wound Culture - Wound, Pubis           Wound culture obtained.  Area with discharge is minimal and I have advised warm compresses and has sent an antibiotic.  Advised that I will call patient with results and change antibiotic if needed.  I explained that I think things are likely related from ingrown hairs.  She is aware that if the other 2 areas become painful she will follow-up and I will perform an I&D on the cystlike areas.  She states at this time they are not bothering her and are not painful so she would like to wait before proceeding with the procedure.      Ingris Subramanian, APRN  02/20/24  11:17 CST

## 2024-02-22 ENCOUNTER — TELEPHONE (OUTPATIENT)
Dept: BARIATRICS/WEIGHT MGMT | Facility: CLINIC | Age: 39
End: 2024-02-22
Payer: COMMERCIAL

## 2024-02-22 NOTE — TELEPHONE ENCOUNTER
----- Message from SYLWIA Parmar sent at 2/22/2024  9:44 AM CST -----  Please advise tht culture is negative, areas are alikely sebaceous cysts. I can excise the two of them if she wishes to proceed with that.

## 2024-02-22 NOTE — TELEPHONE ENCOUNTER
Patient notified of the result from her lab collection. Pt voiced an understanding and will call to schedule accordingly with getting the other spots removed.

## 2024-02-24 LAB
BACTERIA SPEC AEROBE CULT: NORMAL
GRAM STN SPEC: NORMAL

## 2024-03-25 ENCOUNTER — CLINICAL SUPPORT (OUTPATIENT)
Dept: OBSTETRICS AND GYNECOLOGY | Age: 39
End: 2024-03-25
Payer: COMMERCIAL

## 2024-03-25 DIAGNOSIS — Z30.42 ENCOUNTER FOR SURVEILLANCE OF INJECTABLE CONTRACEPTIVE: Primary | ICD-10-CM

## 2024-03-25 PROCEDURE — 96372 THER/PROPH/DIAG INJ SC/IM: CPT | Performed by: NURSE PRACTITIONER

## 2024-03-25 RX ORDER — MEDROXYPROGESTERONE ACETATE 150 MG/ML
150 INJECTION, SUSPENSION INTRAMUSCULAR ONCE
Status: COMPLETED | OUTPATIENT
Start: 2024-03-25 | End: 2024-03-25

## 2024-03-25 RX ADMIN — MEDROXYPROGESTERONE ACETATE 150 MG: 150 INJECTION, SUSPENSION INTRAMUSCULAR at 14:49

## 2024-09-17 ENCOUNTER — CLINICAL SUPPORT (OUTPATIENT)
Dept: OBSTETRICS AND GYNECOLOGY | Age: 39
End: 2024-09-17
Payer: COMMERCIAL

## 2024-09-17 DIAGNOSIS — Z30.42 ENCOUNTER FOR SURVEILLANCE OF INJECTABLE CONTRACEPTIVE: Primary | ICD-10-CM

## 2024-09-17 PROCEDURE — 96372 THER/PROPH/DIAG INJ SC/IM: CPT | Performed by: NURSE PRACTITIONER

## 2024-09-17 RX ORDER — MEDROXYPROGESTERONE ACETATE 150 MG/ML
150 INJECTION, SUSPENSION INTRAMUSCULAR ONCE
Status: COMPLETED | OUTPATIENT
Start: 2024-09-17 | End: 2024-09-17

## 2024-09-17 RX ADMIN — MEDROXYPROGESTERONE ACETATE 150 MG: 150 INJECTION, SUSPENSION INTRAMUSCULAR at 16:03

## 2024-10-03 ENCOUNTER — OFFICE VISIT (OUTPATIENT)
Dept: OBSTETRICS AND GYNECOLOGY | Age: 39
End: 2024-10-03
Payer: COMMERCIAL

## 2024-10-03 VITALS
WEIGHT: 263 LBS | SYSTOLIC BLOOD PRESSURE: 128 MMHG | BODY MASS INDEX: 39.86 KG/M2 | HEIGHT: 68 IN | DIASTOLIC BLOOD PRESSURE: 80 MMHG

## 2024-10-03 DIAGNOSIS — Z01.419 WOMEN'S ANNUAL ROUTINE GYNECOLOGICAL EXAMINATION: Primary | ICD-10-CM

## 2024-10-03 DIAGNOSIS — Z12.4 SCREENING FOR CERVICAL CANCER: ICD-10-CM

## 2024-10-03 DIAGNOSIS — Z30.42 ENCOUNTER FOR SURVEILLANCE OF INJECTABLE CONTRACEPTIVE: ICD-10-CM

## 2024-10-03 PROCEDURE — 87624 HPV HI-RISK TYP POOLED RSLT: CPT | Performed by: NURSE PRACTITIONER

## 2024-10-03 PROCEDURE — G0123 SCREEN CERV/VAG THIN LAYER: HCPCS | Performed by: NURSE PRACTITIONER

## 2024-10-03 NOTE — PROGRESS NOTES
Chief Complaint   Patient presents with    Gynecologic Exam     Patient is here for annual well GYN Exam.  Last well GYN exam 23 and pap 21, normal/-HPV. On Depo Inj for contraception. Pt still having regular periods. Patient denies current pelvic pain, abnormal vaginal bleeding or discharge, dyspareunia, urinary incontinence, and voices no other complaints.          History:  Reginald Hassan is a 38 y.o. female who presents today for evaluation of the above problems.      Reginald Hassan is a 38 y.o. female here today for annual GYN examination.   She is premenopausal.   Periods are regular- LMP 2024  Her last Pap smear was , and was normal/-HPV.   Last Mammogram  - BI-RADS 1  No sexual partner   Does not desire STD screening    Contraception-DEPO  Her medical history is reviewed.         ROS:  Review of Systems   Constitutional: Negative.    HENT: Negative.     Eyes: Negative.    Respiratory: Negative.     Cardiovascular: Negative.    Gastrointestinal: Negative.    Endocrine: Negative.    Genitourinary: Negative.    Musculoskeletal: Negative.    Skin: Negative.    Neurological: Negative.    Psychiatric/Behavioral: Negative.         No Known Allergies  Past Medical History:   Diagnosis Date    Abnormal Pap smear of cervix     Cervical dysplasia     Diabetes mellitus     Disease of thyroid gland     Hypertension     Hypothyroidism     Mixed hyperlipidemia 2023    Urolithiasis 12/15/2016     Past Surgical History:   Procedure Laterality Date    CARPAL TUNNEL RELEASE      CERVICAL BIOPSY  W/ LOOP ELECTRODE EXCISION       SECTION      COLONOSCOPY  2023    ENDOSCOPY  2023     Family History   Problem Relation Age of Onset    Diabetes Paternal Grandmother     Hypertension Maternal Grandfather     Coronary artery disease Maternal Grandfather     Breast cancer Maternal Aunt     Kidney disease Father     Hypertension Father     Ovarian cancer Neg Hx     Uterine cancer Neg Hx     Colon  cancer Neg Hx     Melanoma Neg Hx       reports that she has quit smoking. Her smoking use included cigarettes. She has never used smokeless tobacco. She reports that she does not drink alcohol and does not use drugs.      Current Outpatient Medications:     amLODIPine (NORVASC) 5 MG tablet, TAKE 1 TABLET (5 MG TOTAL) BY MOUTH DAILY., Disp: , Rfl:     amoxicillin-clavulanate (AUGMENTIN) 875-125 MG per tablet, , Disp: , Rfl:     BD PEN NEEDLE ROBBIE U/F 32G X 4 MM misc, USE TO INJECT INSULIN 3 TIMES DAILY AS NEEDED., Disp: , Rfl:     citalopram (CeleXA) 10 MG tablet, Take 1 tablet by mouth Daily., Disp: , Rfl:     Continuous Blood Gluc Sensor (Dexcom G6 Sensor), CHANGE EVERY 10 DAYS, Disp: , Rfl:     Continuous Blood Gluc Transmit (Dexcom G6 Transmitter) misc, CHANGE EVERY 90 DAYS, Disp: , Rfl:     fluticasone (FLONASE) 50 MCG/ACT nasal spray, Administer 1 spray into the nostril(s) as directed by provider Daily., Disp: , Rfl:     glucose blood test strip, USE TO TEST BLOOD SUGAR 3 TIMES A DAY AS NEEDED, Disp: , Rfl:     Insulin Aspart (novoLOG) 100 UNIT/ML injection, Inject  under the skin into the appropriate area as directed., Disp: , Rfl:     Insulin Disposable Pump (Omnipod 5 G6 Intro, Gen 5,) kit, Use 1 kit Every 72 (Seventy-Two) Hours., Disp: 1 kit, Rfl: 0    Insulin Disposable Pump (Omnipod 5 G6 Pod, Gen 5,) misc, Use 1 each Every 72 (Seventy-Two) Hours., Disp: 10 each, Rfl: 11    Insulin Lispro, 1 Unit Dial, (HUMALOG) 100 UNIT/ML solution pen-injector, 200 Units by Other route Daily., Disp: , Rfl:     levothyroxine (SYNTHROID, LEVOTHROID) 88 MCG tablet, TAKE ONE TABLET DAILY GENERIC FOR SYNTHROID, Disp: 30 tablet, Rfl: 0    Liraglutide (Victoza) 18 MG/3ML solution pen-injector injection, Inject 1.2 mg under the skin into the appropriate area as directed Daily., Disp: , Rfl:     medroxyPROGESTERone (Depo-Provera) 150 MG/ML injection, Inject 1 mL into the appropriate muscle as directed by prescriber Every 3  "(Three) Months., Disp: 1 mL, Rfl: 11    metFORMIN (GLUCOPHAGE) 1000 MG tablet, TAKE ONE TABLET TWICE DAILY WITH MEALS GENERIC FOR GLUCOPHAGE, Disp: 60 tablet, Rfl: 0    mupirocin (BACTROBAN) 2 % ointment, Apply 1 application  topically to the appropriate area as directed 3 (Three) Times a Day., Disp: 30 g, Rfl: 3    ondansetron ODT (ZOFRAN-ODT) 4 MG disintegrating tablet, Place 1 tablet on the tongue Every 6 (Six) Hours As Needed for Nausea., Disp: 12 tablet, Rfl: 0    ONETOUCH ULTRA test strip, USE TO TEST BLOOD SUGAR 3 TIMES A DAY AS NEEDED, Disp: , Rfl:     valsartan (DIOVAN) 160 MG tablet, Take 1 tablet by mouth Daily., Disp: , Rfl:     atorvastatin (LIPITOR) 10 MG tablet, Take 1 tablet by mouth Daily. (Patient not taking: Reported on 10/3/2024), Disp: , Rfl:     OBJECTIVE:  /80   Ht 172.7 cm (68\")   Wt 119 kg (263 lb)   LMP 09/30/2024 (Exact Date)   BMI 39.99 kg/m²    Physical Exam  Exam conducted with a chaperone present.   Constitutional:       Appearance: She is well-developed. She is obese.   HENT:      Head: Normocephalic and atraumatic.   Eyes:      General: Lids are normal.      Conjunctiva/sclera: Conjunctivae normal.      Pupils: Pupils are equal, round, and reactive to light.   Neck:      Thyroid: No thyromegaly.   Cardiovascular:      Rate and Rhythm: Normal rate and regular rhythm.      Heart sounds: Normal heart sounds.   Pulmonary:      Effort: Pulmonary effort is normal.      Breath sounds: Normal breath sounds.   Chest:   Breasts:     Breasts are symmetrical.      Right: No inverted nipple, mass, nipple discharge, skin change or tenderness.      Left: No inverted nipple, mass, nipple discharge, skin change or tenderness.   Abdominal:      General: Bowel sounds are normal.      Palpations: Abdomen is soft.   Genitourinary:     Exam position: Supine.      Labia:         Right: No rash, tenderness, lesion or injury.         Left: No rash, tenderness, lesion or injury.       Vagina: No " signs of injury and foreign body. No vaginal discharge, erythema, tenderness or bleeding.      Cervix: No cervical motion tenderness, discharge or friability.      Uterus: Not deviated, not enlarged, not fixed and not tender.       Adnexa:         Right: No mass, tenderness or fullness.          Left: No mass, tenderness or fullness.        Rectum: Normal. No tenderness or external hemorrhoid.   Musculoskeletal:         General: Normal range of motion.      Cervical back: Normal range of motion and neck supple.   Skin:     General: Skin is warm and dry.   Neurological:      Mental Status: She is alert and oriented to person, place, and time.         Assessment/Plan    Diagnoses and all orders for this visit:    1. Women's annual routine gynecological examination (Primary)  -     Cancel: Liquid-based Pap Smear, Screening  -     Liquid-based Pap Smear, Screening  -     HPV DNA Probe, Direct - ThinPrep Vial, Cervix    2. Screening for cervical cancer  -     Cancel: Liquid-based Pap Smear, Screening  -     Liquid-based Pap Smear, Screening  -     HPV DNA Probe, Direct - ThinPrep Vial, Cervix    3. Encounter for surveillance of injectable contraceptive  Comments:  doing well on current therapy         An After Visit Summary was printed and given to the patient at discharge.  Return in about 1 year (around 10/3/2025).          Adalgisa DAO 10/6/2024   Electronically signed

## 2024-10-07 LAB
GEN CATEG CVX/VAG CYTO-IMP: NORMAL
HPV I/H RISK 4 DNA CVX QL PROBE+SIG AMP: NOT DETECTED
LAB AP CASE REPORT: NORMAL
LAB AP GYN ADDITIONAL INFORMATION: NORMAL
LAB AP GYN OTHER FINDINGS: NORMAL
Lab: NORMAL
PATH INTERP SPEC-IMP: NORMAL
STAT OF ADQ CVX/VAG CYTO-IMP: NORMAL

## 2024-12-12 DIAGNOSIS — Z30.013 ENCOUNTER FOR INITIAL PRESCRIPTION OF INJECTABLE CONTRACEPTIVE: ICD-10-CM

## 2024-12-12 RX ORDER — MEDROXYPROGESTERONE ACETATE 150 MG/ML
150 INJECTION, SUSPENSION INTRAMUSCULAR
Qty: 1 ML | Refills: 3 | Status: SHIPPED | OUTPATIENT
Start: 2024-12-12

## 2025-01-24 ENCOUNTER — CLINICAL SUPPORT (OUTPATIENT)
Dept: OBSTETRICS AND GYNECOLOGY | Age: 40
End: 2025-01-24
Payer: COMMERCIAL

## 2025-01-24 DIAGNOSIS — Z30.42 ENCOUNTER FOR SURVEILLANCE OF INJECTABLE CONTRACEPTIVE: Primary | ICD-10-CM

## 2025-01-24 RX ORDER — MEDROXYPROGESTERONE ACETATE 150 MG/ML
150 INJECTION, SUSPENSION INTRAMUSCULAR ONCE
Status: COMPLETED | OUTPATIENT
Start: 2025-01-24 | End: 2025-01-24

## 2025-01-24 RX ADMIN — MEDROXYPROGESTERONE ACETATE 150 MG: 150 INJECTION, SUSPENSION INTRAMUSCULAR at 14:19

## 2025-07-01 ENCOUNTER — CLINICAL SUPPORT (OUTPATIENT)
Dept: OBSTETRICS AND GYNECOLOGY | Age: 40
End: 2025-07-01
Payer: COMMERCIAL

## 2025-07-01 DIAGNOSIS — Z30.42 ENCOUNTER FOR SURVEILLANCE OF INJECTABLE CONTRACEPTIVE: Primary | ICD-10-CM

## 2025-07-01 RX ORDER — MEDROXYPROGESTERONE ACETATE 150 MG/ML
150 INJECTION, SUSPENSION INTRAMUSCULAR ONCE
Status: COMPLETED | OUTPATIENT
Start: 2025-07-01 | End: 2025-07-01

## 2025-07-01 RX ADMIN — MEDROXYPROGESTERONE ACETATE 150 MG: 150 INJECTION, SUSPENSION INTRAMUSCULAR at 11:34

## 2025-07-01 NOTE — PROGRESS NOTES
Patient presented to office for Depo injection. Verified in date and patient is late. Pregnancy test obtained. Verified name, , & allergies. Patient tolerated injection well.